# Patient Record
Sex: FEMALE | Race: WHITE | NOT HISPANIC OR LATINO | Employment: FULL TIME | URBAN - METROPOLITAN AREA
[De-identification: names, ages, dates, MRNs, and addresses within clinical notes are randomized per-mention and may not be internally consistent; named-entity substitution may affect disease eponyms.]

---

## 2023-05-08 ENCOUNTER — LAB (OUTPATIENT)
Dept: LAB | Facility: LAB | Age: 33
End: 2023-05-08
Payer: MEDICARE

## 2023-05-08 ENCOUNTER — OFFICE VISIT (OUTPATIENT)
Dept: PRIMARY CARE | Facility: CLINIC | Age: 33
End: 2023-05-08
Payer: MEDICARE

## 2023-05-08 VITALS
WEIGHT: 182 LBS | SYSTOLIC BLOOD PRESSURE: 114 MMHG | TEMPERATURE: 98.1 F | DIASTOLIC BLOOD PRESSURE: 76 MMHG | BODY MASS INDEX: 35.73 KG/M2 | HEIGHT: 60 IN | HEART RATE: 80 BPM

## 2023-05-08 DIAGNOSIS — Z13.1 DIABETES MELLITUS SCREENING: ICD-10-CM

## 2023-05-08 DIAGNOSIS — Z11.4 SCREENING FOR HIV WITHOUT PRESENCE OF RISK FACTORS: ICD-10-CM

## 2023-05-08 DIAGNOSIS — Z11.4 ENCOUNTER FOR SCREENING FOR HIV: Primary | ICD-10-CM

## 2023-05-08 DIAGNOSIS — M25.562 CHRONIC PAIN OF BOTH KNEES: ICD-10-CM

## 2023-05-08 DIAGNOSIS — R73.9 HYPERGLYCEMIA: ICD-10-CM

## 2023-05-08 DIAGNOSIS — G89.29 CHRONIC PAIN OF BOTH KNEES: ICD-10-CM

## 2023-05-08 DIAGNOSIS — Z00.00 ROUTINE GENERAL MEDICAL EXAMINATION AT HEALTH CARE FACILITY: ICD-10-CM

## 2023-05-08 DIAGNOSIS — M25.561 CHRONIC PAIN OF BOTH KNEES: ICD-10-CM

## 2023-05-08 PROBLEM — F41.1 ANXIETY, GENERALIZED: Status: ACTIVE | Noted: 2023-05-08

## 2023-05-08 PROBLEM — F84.5 ASPERGER SYNDROME (HHS-HCC): Status: ACTIVE | Noted: 2023-05-08

## 2023-05-08 PROBLEM — D64.9 ANEMIA: Status: ACTIVE | Noted: 2023-05-08

## 2023-05-08 PROBLEM — F84.9 PDD (PERVASIVE DEVELOPMENTAL DISORDER) (HHS-HCC): Status: ACTIVE | Noted: 2023-05-08

## 2023-05-08 PROCEDURE — 80053 COMPREHEN METABOLIC PANEL: CPT

## 2023-05-08 PROCEDURE — 36415 COLL VENOUS BLD VENIPUNCTURE: CPT

## 2023-05-08 PROCEDURE — 83036 HEMOGLOBIN GLYCOSYLATED A1C: CPT

## 2023-05-08 PROCEDURE — 1036F TOBACCO NON-USER: CPT | Performed by: FAMILY MEDICINE

## 2023-05-08 PROCEDURE — 87389 HIV-1 AG W/HIV-1&-2 AB AG IA: CPT

## 2023-05-08 PROCEDURE — G0439 PPPS, SUBSEQ VISIT: HCPCS | Performed by: FAMILY MEDICINE

## 2023-05-08 RX ORDER — ACETAMINOPHEN 500 MG
1000 TABLET ORAL EVERY 8 HOURS PRN
Qty: 30 TABLET | Refills: 3
Start: 2023-05-08 | End: 2023-05-18

## 2023-05-08 RX ORDER — DICLOFENAC SODIUM 10 MG/G
4 GEL TOPICAL 2 TIMES DAILY
Qty: 150 G | Refills: 2 | Status: SHIPPED | OUTPATIENT
Start: 2023-05-08 | End: 2023-05-08 | Stop reason: SDUPTHER

## 2023-05-08 RX ORDER — DICLOFENAC SODIUM 10 MG/G
4 GEL TOPICAL 2 TIMES DAILY
Qty: 150 G | Refills: 2 | Status: SHIPPED | OUTPATIENT
Start: 2023-05-08 | End: 2023-08-02

## 2023-05-08 RX ORDER — CLONAZEPAM 1 MG/1
TABLET ORAL
COMMUNITY
Start: 2010-08-03

## 2023-05-08 RX ORDER — DOCUSATE SODIUM 100 MG/1
1 CAPSULE ORAL 2 TIMES DAILY
COMMUNITY
Start: 2023-01-09

## 2023-05-08 RX ORDER — CITALOPRAM 20 MG/1
20 TABLET, FILM COATED ORAL NIGHTLY
COMMUNITY

## 2023-05-08 RX ORDER — DICLOFENAC SODIUM 10 MG/G
GEL TOPICAL 2 TIMES DAILY
COMMUNITY
Start: 2023-04-24 | End: 2023-05-08 | Stop reason: SDUPTHER

## 2023-05-08 RX ORDER — HYDROXYZINE HYDROCHLORIDE 25 MG/1
25 TABLET, FILM COATED ORAL 3 TIMES DAILY PRN
COMMUNITY
Start: 2023-04-30 | End: 2023-11-13 | Stop reason: SDUPTHER

## 2023-05-08 ASSESSMENT — ENCOUNTER SYMPTOMS
OCCASIONAL FEELINGS OF UNSTEADINESS: 1
LOSS OF SENSATION IN FEET: 0
DEPRESSION: 0

## 2023-05-08 ASSESSMENT — PATIENT HEALTH QUESTIONNAIRE - PHQ9
SUM OF ALL RESPONSES TO PHQ9 QUESTIONS 1 AND 2: 0
2. FEELING DOWN, DEPRESSED OR HOPELESS: NOT AT ALL
1. LITTLE INTEREST OR PLEASURE IN DOING THINGS: NOT AT ALL

## 2023-05-08 ASSESSMENT — PAIN SCALES - GENERAL: PAINLEVEL: 8

## 2023-05-08 ASSESSMENT — COLUMBIA-SUICIDE SEVERITY RATING SCALE - C-SSRS
2. HAVE YOU ACTUALLY HAD ANY THOUGHTS OF KILLING YOURSELF?: NO
6. HAVE YOU EVER DONE ANYTHING, STARTED TO DO ANYTHING, OR PREPARED TO DO ANYTHING TO END YOUR LIFE?: NO
1. IN THE PAST MONTH, HAVE YOU WISHED YOU WERE DEAD OR WISHED YOU COULD GO TO SLEEP AND NOT WAKE UP?: NO

## 2023-05-08 NOTE — PROGRESS NOTES
Subjective   Patient ID: Leslie Johnson is a 32 y.o. female who presents for Annual Exam and Medicare Annual Wellness Visit Subsequent.  HPI    The patient is here for her MWV.  A review of system was completed.  All systems were reviewed and were normal, except for the ones that are listed in the HPI.    Objective   Physical Exam  Constitutional:       Appearance: Normal appearance.   HENT:      Head: Normocephalic and atraumatic.      Right Ear: Tympanic membrane, ear canal and external ear normal.      Left Ear: Tympanic membrane, ear canal and external ear normal.      Nose: Nose normal.      Mouth/Throat:      Mouth: Mucous membranes are moist.      Pharynx: Oropharynx is clear.   Eyes:      Extraocular Movements: Extraocular movements intact.      Conjunctiva/sclera: Conjunctivae normal.      Pupils: Pupils are equal, round, and reactive to light.   Cardiovascular:      Rate and Rhythm: Normal rate and regular rhythm.      Pulses: Normal pulses.   Pulmonary:      Effort: Pulmonary effort is normal.      Breath sounds: Normal breath sounds.   Abdominal:      General: Abdomen is flat. Bowel sounds are normal.      Palpations: Abdomen is soft.   Musculoskeletal:         General: Normal range of motion.      Cervical back: Normal range of motion and neck supple.   Skin:     General: Skin is warm.   Neurological:      General: No focal deficit present.      Mental Status: She is alert and oriented to person, place, and time. Mental status is at baseline.   Psychiatric:         Mood and Affect: Mood normal.         Behavior: Behavior normal.         Thought Content: Thought content normal.         Judgment: Judgment normal.         Assessment/Plan   Problem List Items Addressed This Visit          Other    Encounter for screening for HIV - Primary     Other Visit Diagnoses       Hyperglycemia        Relevant Orders    Hemoglobin A1C    Diabetes mellitus screening        Relevant Orders    Hemoglobin A1C     Comprehensive Metabolic Panel    Screening for HIV without presence of risk factors        Relevant Orders    HIV-1 and HIV-2 antibodies    Routine general medical examination at health care facility        Relevant Orders    1 Year Follow Up In Primary Care - Wellness Exam    Chronic pain of both knees        Relevant Medications    diclofenac sodium (Voltaren) 1 % gel gel    acetaminophen (Tylenol Extra Strength) 500 mg tablet

## 2023-05-09 LAB
ALANINE AMINOTRANSFERASE (SGPT) (U/L) IN SER/PLAS: 11 U/L (ref 7–45)
ALBUMIN (G/DL) IN SER/PLAS: 4.6 G/DL (ref 3.4–5)
ALKALINE PHOSPHATASE (U/L) IN SER/PLAS: 57 U/L (ref 33–110)
ANION GAP IN SER/PLAS: 13 MMOL/L (ref 10–20)
ASPARTATE AMINOTRANSFERASE (SGOT) (U/L) IN SER/PLAS: 12 U/L (ref 9–39)
BILIRUBIN TOTAL (MG/DL) IN SER/PLAS: 0.8 MG/DL (ref 0–1.2)
CALCIUM (MG/DL) IN SER/PLAS: 10.3 MG/DL (ref 8.6–10.6)
CARBON DIOXIDE, TOTAL (MMOL/L) IN SER/PLAS: 29 MMOL/L (ref 21–32)
CHLORIDE (MMOL/L) IN SER/PLAS: 103 MMOL/L (ref 98–107)
CREATININE (MG/DL) IN SER/PLAS: 0.86 MG/DL (ref 0.5–1.05)
ESTIMATED AVERAGE GLUCOSE FOR HBA1C: 103 MG/DL
GFR FEMALE: 90 ML/MIN/1.73M2
GLUCOSE (MG/DL) IN SER/PLAS: 82 MG/DL (ref 74–99)
HEMOGLOBIN A1C/HEMOGLOBIN TOTAL IN BLOOD: 5.2 %
HIV 1/ 2 AG/AB SCREEN: NONREACTIVE
POTASSIUM (MMOL/L) IN SER/PLAS: 4.3 MMOL/L (ref 3.5–5.3)
PROTEIN TOTAL: 7.2 G/DL (ref 6.4–8.2)
SODIUM (MMOL/L) IN SER/PLAS: 141 MMOL/L (ref 136–145)
UREA NITROGEN (MG/DL) IN SER/PLAS: 11 MG/DL (ref 6–23)

## 2023-06-12 LAB
COBALAMIN (VITAMIN B12) (PG/ML) IN SER/PLAS: 252 PG/ML (ref 211–911)
CREATINE KINASE (U/L) IN SER/PLAS: 78 U/L (ref 0–215)

## 2023-08-30 LAB
CHLAMYDIA TRACH., AMPLIFIED: NEGATIVE
N. GONORRHEA, AMPLIFIED: NEGATIVE
TRICHOMONAS VAGINALIS: NEGATIVE

## 2023-10-22 PROBLEM — M62.838 MUSCLE SPASM: Status: ACTIVE | Noted: 2023-10-22

## 2023-10-22 PROBLEM — R20.1: Status: ACTIVE | Noted: 2023-10-22

## 2023-10-22 PROBLEM — R41.83 BORDERLINE INTELLECTUAL DISABILITY: Status: ACTIVE | Noted: 2023-10-22

## 2023-10-22 PROBLEM — G24.9 DYSTONIA: Status: ACTIVE | Noted: 2023-10-22

## 2023-10-22 PROBLEM — R74.8 ELEVATED LIVER ENZYMES: Status: ACTIVE | Noted: 2023-10-22

## 2023-10-22 PROBLEM — R20.2 PARESTHESIA OF RIGHT FOOT: Status: ACTIVE | Noted: 2023-10-22

## 2023-10-22 PROBLEM — D31.31 CHOROIDAL NEVUS OF RIGHT EYE: Status: ACTIVE | Noted: 2023-10-22

## 2023-10-22 PROBLEM — T43.595A: Status: ACTIVE | Noted: 2023-10-22

## 2023-10-22 PROBLEM — R29.2 HYPERREFLEXIA: Status: ACTIVE | Noted: 2023-10-22

## 2023-10-22 PROBLEM — E66.9 OBESITY: Status: ACTIVE | Noted: 2023-10-22

## 2023-10-22 PROBLEM — R29.6 FREQUENT FALLS: Status: ACTIVE | Noted: 2023-10-22

## 2023-10-22 PROBLEM — F32.A DEPRESSION: Status: ACTIVE | Noted: 2023-10-22

## 2023-10-22 PROBLEM — R87.612 LGSIL ON PAP SMEAR OF CERVIX: Status: ACTIVE | Noted: 2023-10-22

## 2023-10-22 PROBLEM — R29.898 BILATERAL LEG WEAKNESS: Status: ACTIVE | Noted: 2023-10-22

## 2023-10-22 PROBLEM — G89.29 CHRONIC PAIN OF BOTH KNEES: Status: ACTIVE | Noted: 2023-10-22

## 2023-10-22 PROBLEM — H52.13 BILATERAL MYOPIA: Status: ACTIVE | Noted: 2023-10-22

## 2023-10-22 PROBLEM — F63.3 TRICHOTILLOMANIA: Status: ACTIVE | Noted: 2023-02-23

## 2023-10-22 PROBLEM — G21.19 DRUG-INDUCED PARKINSONISM (MULTI): Status: ACTIVE | Noted: 2023-02-23

## 2023-10-22 PROBLEM — M25.562 CHRONIC PAIN OF BOTH KNEES: Status: ACTIVE | Noted: 2023-10-22

## 2023-10-22 PROBLEM — N91.2 AMENORRHEA: Status: ACTIVE | Noted: 2023-10-22

## 2023-10-22 PROBLEM — K21.9 GERD (GASTROESOPHAGEAL REFLUX DISEASE): Status: ACTIVE | Noted: 2023-10-22

## 2023-10-22 PROBLEM — R79.89 LOW VITAMIN B12 LEVEL: Status: ACTIVE | Noted: 2023-10-22

## 2023-10-22 PROBLEM — R26.89 IMBALANCE: Status: ACTIVE | Noted: 2023-10-22

## 2023-10-22 PROBLEM — F41.9 ANXIETY: Status: ACTIVE | Noted: 2023-10-22

## 2023-10-22 PROBLEM — K64.9 HEMORRHOIDS: Status: ACTIVE | Noted: 2023-10-22

## 2023-10-22 PROBLEM — N92.6 ABNORMAL MENSTRUAL PERIODS: Status: ACTIVE | Noted: 2023-10-22

## 2023-10-22 PROBLEM — M25.561 CHRONIC PAIN OF BOTH KNEES: Status: ACTIVE | Noted: 2023-10-22

## 2023-10-22 PROBLEM — K62.89 PROCTALGIA: Status: ACTIVE | Noted: 2023-10-22

## 2023-10-22 RX ORDER — POLYETHYLENE GLYCOL 3350 17 G/17G
POWDER, FOR SOLUTION ORAL
COMMUNITY
End: 2023-11-13 | Stop reason: SDUPTHER

## 2023-10-22 RX ORDER — BACLOFEN 5 MG/1
3 TABLET ORAL 3 TIMES DAILY
COMMUNITY
Start: 2023-06-09 | End: 2023-11-13 | Stop reason: SDUPTHER

## 2023-10-22 RX ORDER — MICONAZOLE NITRATE 2 %
2 CREAM (GRAM) TOPICAL EVERY 2 HOUR PRN
COMMUNITY
Start: 2020-01-09 | End: 2023-11-13 | Stop reason: WASHOUT

## 2023-10-22 RX ORDER — CLONAZEPAM 0.5 MG/1
0.5 TABLET ORAL 2 TIMES DAILY
COMMUNITY
Start: 2020-01-09 | End: 2023-11-13 | Stop reason: WASHOUT

## 2023-10-22 RX ORDER — OXCARBAZEPINE 300 MG/1
300 TABLET, FILM COATED ORAL 2 TIMES DAILY
COMMUNITY
Start: 2023-07-19

## 2023-10-22 RX ORDER — DIVALPROEX SODIUM 250 MG/1
TABLET, FILM COATED, EXTENDED RELEASE ORAL
COMMUNITY
Start: 2010-08-10

## 2023-10-22 RX ORDER — LAMOTRIGINE 25 MG/1
TABLET ORAL
COMMUNITY
Start: 2022-10-19

## 2023-10-22 RX ORDER — DIVALPROEX SODIUM 500 MG/1
2000 TABLET, FILM COATED, EXTENDED RELEASE ORAL NIGHTLY
COMMUNITY
Start: 2011-03-02

## 2023-10-22 RX ORDER — ZINC GLUCONATE 50 MG
2000 TABLET ORAL DAILY
COMMUNITY
Start: 2023-06-15

## 2023-10-22 RX ORDER — PANTOPRAZOLE SODIUM 20 MG/1
TABLET, DELAYED RELEASE ORAL
COMMUNITY
Start: 2020-01-09

## 2023-10-22 RX ORDER — TOPIRAMATE 50 MG/1
50 TABLET, FILM COATED ORAL 2 TIMES DAILY
COMMUNITY
Start: 2011-03-02 | End: 2023-11-13 | Stop reason: WASHOUT

## 2023-10-22 RX ORDER — SERTRALINE HYDROCHLORIDE 100 MG/1
100 TABLET, FILM COATED ORAL
COMMUNITY
Start: 2013-11-19

## 2023-10-22 RX ORDER — MEDROXYPROGESTERONE ACETATE 150 MG/ML
1 INJECTION, SUSPENSION INTRAMUSCULAR
COMMUNITY
Start: 2019-09-26

## 2023-10-22 RX ORDER — BENZTROPINE MESYLATE 2 MG/1
TABLET ORAL
COMMUNITY
Start: 2020-08-28

## 2023-10-22 RX ORDER — LITHIUM CARBONATE 450 MG/1
1 TABLET ORAL 2 TIMES DAILY
COMMUNITY
Start: 2022-12-16 | End: 2023-11-13 | Stop reason: WASHOUT

## 2023-10-22 RX ORDER — ACETAMINOPHEN 500 MG
1 TABLET ORAL EVERY 6 HOURS PRN
COMMUNITY
Start: 2023-02-17 | End: 2023-11-13 | Stop reason: SDUPTHER

## 2023-10-22 RX ORDER — BENZTROPINE MESYLATE 1 MG/1
TABLET ORAL
COMMUNITY
Start: 2020-08-28

## 2023-10-22 RX ORDER — LISDEXAMFETAMINE DIMESYLATE 30 MG/1
30 CAPSULE ORAL EVERY MORNING
COMMUNITY
Start: 2023-03-08

## 2023-10-22 RX ORDER — OXCARBAZEPINE 150 MG/1
TABLET, FILM COATED ORAL
COMMUNITY
Start: 2023-04-18

## 2023-10-22 RX ORDER — ASPIRIN 325 MG
1 TABLET, DELAYED RELEASE (ENTERIC COATED) ORAL NIGHTLY
COMMUNITY
Start: 2014-04-10

## 2023-10-22 RX ORDER — DEXTROAMPHETAMINE SULFATE, DEXTROAMPHETAMINE SACCHARATE, AMPHETAMINE ASPARTATE MONOHYDRATE, AND AMPHETAMINE SULFATE 6.25; 6.25; 6.25; 6.25 MG/1; MG/1; MG/1; MG/1
25 CAPSULE, EXTENDED RELEASE ORAL EVERY MORNING
COMMUNITY
Start: 2023-07-19

## 2023-10-22 RX ORDER — LITHIUM CARBONATE 300 MG/1
1 CAPSULE ORAL 2 TIMES DAILY
COMMUNITY
Start: 2023-01-04

## 2023-10-22 RX ORDER — DEXTROAMPHETAMINE SACCHARATE, AMPHETAMINE ASPARTATE MONOHYDRATE, DEXTROAMPHETAMINE SULFATE AND AMPHETAMINE SULFATE 5; 5; 5; 5 MG/1; MG/1; MG/1; MG/1
CAPSULE, EXTENDED RELEASE ORAL
COMMUNITY
Start: 2023-02-06

## 2023-10-22 RX ORDER — DEXTROAMPHETAMINE SACCHARATE, AMPHETAMINE ASPARTATE, DEXTROAMPHETAMINE SULFATE AND AMPHETAMINE SULFATE 2.5; 2.5; 2.5; 2.5 MG/1; MG/1; MG/1; MG/1
TABLET ORAL
COMMUNITY
Start: 2023-01-04

## 2023-10-22 RX ORDER — DEXTROAMPHETAMINE SULFATE, DEXTROAMPHETAMINE SACCHARATE, AMPHETAMINE ASPARTATE MONOHYDRATE, AND AMPHETAMINE SULFATE 9.375; 9.375; 9.375; 9.375 MG/1; MG/1; MG/1; MG/1
37.5 CAPSULE, EXTENDED RELEASE ORAL EVERY MORNING
COMMUNITY
Start: 2023-06-28

## 2023-10-22 RX ORDER — NORGESTIMATE AND ETHINYL ESTRADIOL 0.25-0.035
1 KIT ORAL
COMMUNITY
Start: 2014-10-09

## 2023-10-22 RX ORDER — FLUCONAZOLE 150 MG/1
150 TABLET ORAL
COMMUNITY
Start: 2014-04-10

## 2023-10-22 RX ORDER — DICLOFENAC SODIUM 30 MG/G
GEL TOPICAL 2 TIMES DAILY
COMMUNITY
Start: 2023-01-27 | End: 2023-11-13 | Stop reason: SDUPTHER

## 2023-10-22 RX ORDER — LURASIDONE HYDROCHLORIDE 40 MG/1
40 TABLET, FILM COATED ORAL
COMMUNITY
Start: 2014-02-11

## 2023-10-22 RX ORDER — QUETIAPINE 400 MG/1
800 TABLET, FILM COATED, EXTENDED RELEASE ORAL EVERY EVENING
COMMUNITY
Start: 2011-03-02

## 2023-10-24 ENCOUNTER — TREATMENT (OUTPATIENT)
Dept: PHYSICAL THERAPY | Facility: CLINIC | Age: 33
End: 2023-10-24
Payer: MEDICARE

## 2023-10-24 DIAGNOSIS — R26.9 GAIT ABNORMALITY: Primary | ICD-10-CM

## 2023-10-24 PROCEDURE — 97110 THERAPEUTIC EXERCISES: CPT | Mod: GP | Performed by: PHYSICAL THERAPIST

## 2023-10-24 PROCEDURE — 97112 NEUROMUSCULAR REEDUCATION: CPT | Mod: GP | Performed by: PHYSICAL THERAPIST

## 2023-10-24 NOTE — PROGRESS NOTES
"Physical Therapy    Physical Therapy Treatment    Patient Name: Leslie Johnson  MRN: [id number]  Today's Date: 10/26/2023  Time Calculation  Start Time: 1800  Stop Time: 1830  Time Calculation (min): 30 min      Assessment:    Attempted to lower patient cane during session, however pt. Decline reporting that she likes her cane at a higher height.  Pt. With intermittent antalgic gait intermittently kicking her lower extremities.  Immediately following mat table exercises, pt. With normal gait pattern. However, on the way out the patient returned to abnormal gait pattern.      Plan: functional mobility with weighted vest , review HEP       Current Problem  1. Gait abnormality            Subjective   General:  Pt. Might be moving in mid November.  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  She has a team meeting on 10/27/24, it is a zoom meeting, county board her mom and behavioral health specialist and [employer].  Pt. Was brought to session by \"Lamar\" from [employer]. Pt. Reports that her mom told her to \"kick\" her legs when she is walking.     *Pt. Arrived 15 minutes late to today's visit.      Pain: numbness/tingling in Left lower leg, pt. Denies pain  Precautions: bipolar I disorder, trichotillomania, PDD, borderline intellecutal disability     Objective        Pt. Arrived to session with SPC, abnormal gait pattern, antalgic, inconsistent gait pattern, intermittently kicking lower extremities          Treatments:  There Ex:  Recumbent bike BLE's only at L 2.0mph for 10 minutes  Attempted to lower pt. SPC down to appropriate height however pt. Declined this recommendation.  Suggested trekking pole versus SPC however pt. Declined this recommendation    Neuro Re-Ed:  Parasympathetic nervous system activation with deep breathing while sitting on stability ball, exhale bouncing pink ball x4 sec. and in hale holding ball 4 sec. X10  Core strengthening, proprioceptive challenge " sitting on stability ball with pink ball toss/catch  Mat table exercises for improving proprioceptive activation with PT demo throughout for proper technique :   Quadruped to tall kneeling x10   Quadruped twist x10 to each side   Quadruped WS x5      OP EDUCATION: educated pt. On benefits of deep breathing, yoga for stress management and promoting relaxation     Goals:  Active       PT Problem       PT Goal 1       Start:  10/26/23       Plan of care is continued from [facility] legacy documentation system.  In order to view goals for this plan of care please refer to legacy documentation system.

## 2023-10-26 PROBLEM — R26.9 GAIT ABNORMALITY: Status: ACTIVE | Noted: 2023-10-26

## 2023-10-31 ENCOUNTER — TREATMENT (OUTPATIENT)
Dept: PHYSICAL THERAPY | Facility: CLINIC | Age: 33
End: 2023-10-31
Payer: MEDICARE

## 2023-10-31 DIAGNOSIS — R26.9 GAIT ABNORMALITY: Primary | ICD-10-CM

## 2023-10-31 PROCEDURE — 97110 THERAPEUTIC EXERCISES: CPT | Mod: GP | Performed by: PHYSICAL THERAPIST

## 2023-10-31 PROCEDURE — 97112 NEUROMUSCULAR REEDUCATION: CPT | Mod: GP | Performed by: PHYSICAL THERAPIST

## 2023-10-31 NOTE — PROGRESS NOTES
"Physical Therapy    Physical Therapy Treatment    Patient Name: Leslie Johnson  MRN: [id number]  Today's Date: 11/1/2023  Treatment Number: 3  Time Calculation  Start Time: 1546  Stop Time: 1629  Time Calculation (min): 43 min      Assessment:    Pt. Responded well to promotion of normalized gait pattern with dual motor tasks such as ball toss/catch. Pt. With improved functional mobility fluidity and quality directly after session until she got to the parking lot, where at that point the patient demonstrated decreased left knee flexion and limping gait.  Time was spent reviewing pt. HEP exercises for improving core strength, pt. Benefitted from PT demo throughout with pt. Preferred music.  Pt. Did not respond to weighted vest to improve proprioception therefore weighted vest was removed.     Plan: functional mobility with weighted vest , review HEP       Current Problem  1. Gait abnormality              Subjective   Pt. Reports that she is moving on 11/27/23.  She has been using her cane more often.  She brings her walker to higher heights.      Pain: numbness/tingling in Left lower leg, pt. Denies pain  Precautions: bipolar I disorder, trichotillomania, PDD, borderline intellecutal disability     Objective        Pt. Arrived to session with SPC, abnormal gait pattern, antalgic, inconsistent gait pattern, intermittently kicking lower extremities        Treatments:  There Ex:  Recumbent bike BLE's only at L 3.0mph for 10 minutes for promoting neuroplasticity  Provided pt. And [employer] provider \"pantera\" with printed handout of HEP    Neuro Re-Ed:  [place]limpo SB bounce and catch with functional mobility to unlock more natural gait pattern 80'x1  Backwards pink SB ball toss/ catch for balance and promotion of increasing fluidity of gait, promoting L knee flexion  Mat table exercises for improving proprioceptive activation with PT demo throughout for proper technique with pt. Preferred music :   Quadruped to tall kneeling " x10   Quadruped twist x10 to each side   Quadruped WS x10   Quadruped to half kneeling x5 each side  Attempted soccer ball kick with weighted vest however pt. Requesting to have vest removed.  Soccer ball stop/kick x10 with each LE to promote improved knee flexion bilaterally.        OP EDUCATION:      Goals:  Active       PT Problem       PT Goal 1       Start:  10/26/23       Plan of care is continued from [facility] legacy documentation system.  In order to view goals for this plan of care please refer to legacy documentation system.

## 2023-11-07 ENCOUNTER — TREATMENT (OUTPATIENT)
Dept: PHYSICAL THERAPY | Facility: CLINIC | Age: 33
End: 2023-11-07
Payer: MEDICARE

## 2023-11-07 DIAGNOSIS — R26.9 GAIT ABNORMALITY: ICD-10-CM

## 2023-11-07 DIAGNOSIS — R26.89 IMBALANCE: Primary | ICD-10-CM

## 2023-11-07 PROCEDURE — 97110 THERAPEUTIC EXERCISES: CPT | Mod: GP | Performed by: PHYSICAL THERAPIST

## 2023-11-07 PROCEDURE — 97112 NEUROMUSCULAR REEDUCATION: CPT | Mod: GP | Performed by: PHYSICAL THERAPIST

## 2023-11-07 NOTE — PROGRESS NOTES
"Physical Therapy    Physical Therapy Treatment    Patient Name: Leslie Johnson  MRN: [id number]  Today's Date: 11/7/2023  Treatment Number: 3  Time Calculation  Start Time: 1645  Stop Time: 1730  Time Calculation (min): 45 min      Assessment:    Pt. Demonstrated improved gait pattern with obstacle negotiation in forward and lateral directions. Overall pt. Is demonstrating increased fluidity of gait and is demonstrating improved left sided knee flexion during swing phase versus tendency to keep left knee extended in swing phase.  Pt. Responded well to having therapist mirror each exercise for demonstration in order to increase accuracy of technique.      Plan: lunging, step-ups laterally, stepping over objects, review HEP       Current Problem  1. Imbalance        2. Gait abnormality                Subjective   Pt. Reports that she is moving on 11/27/23.  She has been using her cane more often.  She brings her walker to higher heights.      Pain: numbness/tingling in Left lower leg, pt. Denies pain  Precautions: bipolar I disorder, trichotillomania, PDD, borderline intellecutal disability     Objective        Pt. Arrived to session with SPC, abnormal gait pattern, antalgic, inconsistent gait pattern, intermittently kicking lower extremities        Treatments:  There Ex:  Recumbent bike BLE's only at L 3.0mph for 10 minutes for promoting neuroplasticity  Provided pt. And [employer] provider \"pantera\" with printed handout of HEP    Step up/down laterally x10 to each side with occasional unilateral UE support     Neuro Re-Ed:  Mat table exercises for improving proprioceptive activation with PT demo throughout for proper technique with pt. Preferred music :   Quadruped to tall kneeling x10   Quadruped twist x10 to each side   Quadruped WS x10   Quadruped to half kneeling x5 each side  Soccer ball stop/kick x10 with each LE to promote improved knee flexion bilaterally.      Obstacle course F with hurdles, SPC, SBQC, LBQC " spaced out 2-3 ft. X2 laps  Obstacle course lateral with hurdles, SPC, SBQC, LBQC spaced out 2-3 ft. X1 laps each side  Forward lunges x10 each side lunging to blue tile   Forward lunges x10 alternating side to blue tile      Side lunges x10 with 180' turns between with PT demo, lunging to blue tile    OP EDUCATION:      Goals:  Active       PT Problem       PT Goal 1       Start:  10/26/23       Plan of care is continued from [facility] legacy documentation system.  In order to view goals for this plan of care please refer to legacy documentation system.

## 2023-11-08 ENCOUNTER — APPOINTMENT (OUTPATIENT)
Dept: PRIMARY CARE | Facility: CLINIC | Age: 33
End: 2023-11-08
Payer: MEDICARE

## 2023-11-10 ENCOUNTER — TREATMENT (OUTPATIENT)
Dept: PHYSICAL THERAPY | Facility: CLINIC | Age: 33
End: 2023-11-10
Payer: MEDICARE

## 2023-11-10 DIAGNOSIS — R26.89 IMBALANCE: ICD-10-CM

## 2023-11-10 DIAGNOSIS — R26.9 GAIT ABNORMALITY: Primary | ICD-10-CM

## 2023-11-10 PROCEDURE — 97530 THERAPEUTIC ACTIVITIES: CPT | Mod: GP | Performed by: PHYSICAL THERAPIST

## 2023-11-10 PROCEDURE — 97110 THERAPEUTIC EXERCISES: CPT | Mod: GP | Performed by: PHYSICAL THERAPIST

## 2023-11-10 NOTE — PROGRESS NOTES
"Physical Therapy    Physical Therapy Treatment    Patient Name: Leslie Johnson  MRN: [id number]  Today's Date: 11/10/2023  Treatment Number: 4  Time Calculation  Start Time: 1450  Stop Time: 1535  Time Calculation (min): 45 min    Assessment:    Pt. Requires maximal VC for attention to task due to pt. With tendency to focus on situations that are occurring externally.  Pt. With increased abberent left leg movement today compared to prior visit. Pt. With increased accuracy during mat table exercises, with good recall of technique. Pt. Benefitted from counting during exercises to allow for improved focus on each exercise.      Plan: lunging, step-ups laterally, stepping over objects, review HEP       Current Problem  1. Gait abnormality        2. Imbalance                  Subjective   Pt. Reports that she is triggered because of a situation that happened with \"Racheal\" and pt. Reports \" I felt like I was on house arrest in my own home\"      Pain: numbness/tingling in Left lower leg, pt. Denies pain  Precautions: bipolar I disorder, trichotillomania, PDD, borderline intellecutal disability     Objective        Pt. Arrived to session with SPC, abnormal gait pattern, antalgic, inconsistent gait pattern, intermittently kicking lower extremities        Treatments:  There Ex:  Recumbent bike BLE's only at L 3.0mph for 10 minutes for promoting neuroplasticity    Provided pt. And [employer] provider \"pantera\" with printed handout of HEP    Step up/down laterally x15 to each side with occasional unilateral UE support     Neuro Re-Ed:  Mat table exercises for improving proprioceptive activation with PT demo throughout for proper technique with pt. Preferred music :   Quadruped to tall kneeling x10   Quadruped twist x10 to each side   Quadruped to half kneeling x10 each side    Soccer ball kicks to \"goal\" x10 on each side     Obstacle course F with hurdles x5 hurdles x6 laps (x3 laps leading with RLE and x3 laps with LLE)    Forward " lunges x10 each side lunging to blue tile   Forward lunges x10 alternating side to blue tile    Lateral lunges on blue tile x10 with turn in between     Side lunges x10 with 180' turns between with PT demo, lunging to blue tile    OP EDUCATION:      Goals:  Active       PT Problem       PT Goal 1       Start:  10/26/23       Plan of care is continued from [facility] legacy documentation system.  In order to view goals for this plan of care please refer to legacy documentation system.

## 2023-11-13 ENCOUNTER — OFFICE VISIT (OUTPATIENT)
Dept: PRIMARY CARE | Facility: CLINIC | Age: 33
End: 2023-11-13
Payer: MEDICARE

## 2023-11-13 VITALS
SYSTOLIC BLOOD PRESSURE: 100 MMHG | BODY MASS INDEX: 29.67 KG/M2 | HEART RATE: 76 BPM | WEIGHT: 162.2 LBS | DIASTOLIC BLOOD PRESSURE: 70 MMHG | TEMPERATURE: 98 F

## 2023-11-13 DIAGNOSIS — K59.00 CONSTIPATION, UNSPECIFIED CONSTIPATION TYPE: ICD-10-CM

## 2023-11-13 DIAGNOSIS — R29.898 BILATERAL LEG WEAKNESS: ICD-10-CM

## 2023-11-13 DIAGNOSIS — Z23 IMMUNIZATION DUE: Primary | ICD-10-CM

## 2023-11-13 DIAGNOSIS — F41.1 ANXIETY, GENERALIZED: ICD-10-CM

## 2023-11-13 PROCEDURE — G0008 ADMIN INFLUENZA VIRUS VAC: HCPCS | Performed by: FAMILY MEDICINE

## 2023-11-13 PROCEDURE — 99214 OFFICE O/P EST MOD 30 MIN: CPT | Performed by: FAMILY MEDICINE

## 2023-11-13 PROCEDURE — 1036F TOBACCO NON-USER: CPT | Performed by: FAMILY MEDICINE

## 2023-11-13 PROCEDURE — 90686 IIV4 VACC NO PRSV 0.5 ML IM: CPT | Performed by: FAMILY MEDICINE

## 2023-11-13 RX ORDER — POLYETHYLENE GLYCOL 3350 17 G/17G
POWDER, FOR SOLUTION ORAL
Qty: 510 G | Refills: 3 | Status: SHIPPED | OUTPATIENT
Start: 2023-11-13 | End: 2023-11-13 | Stop reason: SDUPTHER

## 2023-11-13 RX ORDER — ACETAMINOPHEN 500 MG
500 TABLET ORAL EVERY 6 HOURS PRN
Qty: 60 TABLET | Refills: 3 | Status: SHIPPED | OUTPATIENT
Start: 2023-11-13 | End: 2023-11-13 | Stop reason: SDUPTHER

## 2023-11-13 RX ORDER — HYDROXYZINE HYDROCHLORIDE 25 MG/1
25 TABLET, FILM COATED ORAL 3 TIMES DAILY PRN
Qty: 90 TABLET | Refills: 1 | Status: SHIPPED | OUTPATIENT
Start: 2023-11-13 | End: 2023-11-13 | Stop reason: SDUPTHER

## 2023-11-13 RX ORDER — POLYETHYLENE GLYCOL 3350 17 G/17G
POWDER, FOR SOLUTION ORAL
Qty: 510 G | Refills: 3 | Status: SHIPPED | OUTPATIENT
Start: 2023-11-13 | End: 2024-03-06 | Stop reason: SDUPTHER

## 2023-11-13 RX ORDER — HYDROXYZINE HYDROCHLORIDE 25 MG/1
25 TABLET, FILM COATED ORAL 3 TIMES DAILY PRN
Qty: 90 TABLET | Refills: 1 | Status: SHIPPED | OUTPATIENT
Start: 2023-11-13 | End: 2023-11-15 | Stop reason: SDUPTHER

## 2023-11-13 RX ORDER — BACLOFEN 5 MG/1
15 TABLET ORAL 3 TIMES DAILY
Qty: 90 TABLET | Refills: 5 | Status: SHIPPED | OUTPATIENT
Start: 2023-11-13 | End: 2023-11-13 | Stop reason: SDUPTHER

## 2023-11-13 RX ORDER — ACETAMINOPHEN 500 MG
500 TABLET ORAL EVERY 6 HOURS PRN
Qty: 60 TABLET | Refills: 3 | Status: SHIPPED | OUTPATIENT
Start: 2023-11-13

## 2023-11-13 RX ORDER — DICLOFENAC SODIUM 10 MG/G
4 GEL TOPICAL 4 TIMES DAILY
Qty: 150 G | Refills: 3 | Status: SHIPPED | OUTPATIENT
Start: 2023-11-13

## 2023-11-13 RX ORDER — DICLOFENAC SODIUM 30 MG/G
GEL TOPICAL 2 TIMES DAILY
Qty: 100 G | Refills: 5 | Status: SHIPPED | OUTPATIENT
Start: 2023-11-13 | End: 2023-11-13 | Stop reason: WASHOUT

## 2023-11-13 RX ORDER — DICLOFENAC SODIUM 10 MG/G
4 GEL TOPICAL 4 TIMES DAILY
Qty: 150 G | Refills: 3 | Status: SHIPPED | OUTPATIENT
Start: 2023-11-13 | End: 2023-11-13 | Stop reason: SDUPTHER

## 2023-11-13 RX ORDER — BACLOFEN 5 MG/1
15 TABLET ORAL 3 TIMES DAILY
Qty: 90 TABLET | Refills: 5 | Status: SHIPPED | OUTPATIENT
Start: 2023-11-13 | End: 2023-12-01 | Stop reason: SDUPTHER

## 2023-11-13 NOTE — PROGRESS NOTES
Subjective   Patient ID: Leslie Johnson is a 33 y.o. female who presents for Follow-up.  HPI    The patient is here for a routine follow-up visit.  She started a new job and is required to have:  - influenza vaccine.  -A review of system was completed.      All systems were reviewed and were normal, except for the ones that are listed in the HPI.    Objective   Physical Exam  Constitutional:       Appearance: Normal appearance.   HENT:      Head: Normocephalic and atraumatic.      Right Ear: Tympanic membrane, ear canal and external ear normal.      Left Ear: Tympanic membrane, ear canal and external ear normal.      Nose: Nose normal.      Mouth/Throat:      Mouth: Mucous membranes are moist.      Pharynx: Oropharynx is clear.   Eyes:      Extraocular Movements: Extraocular movements intact.      Conjunctiva/sclera: Conjunctivae normal.      Pupils: Pupils are equal, round, and reactive to light.   Cardiovascular:      Rate and Rhythm: Normal rate and regular rhythm.      Pulses: Normal pulses.   Pulmonary:      Effort: Pulmonary effort is normal.      Breath sounds: Normal breath sounds.   Abdominal:      General: Abdomen is flat. Bowel sounds are normal.      Palpations: Abdomen is soft.   Musculoskeletal:         General: Normal range of motion.      Cervical back: Normal range of motion and neck supple.   Skin:     General: Skin is warm.   Neurological:      General: No focal deficit present.      Mental Status: She is alert and oriented to person, place, and time. Mental status is at baseline.   Psychiatric:         Mood and Affect: Mood normal.         Behavior: Behavior normal.         Thought Content: Thought content normal.         Judgment: Judgment normal.     Assessment/Plan   Problem List Items Addressed This Visit       Anxiety, generalized    Relevant Medications    hydrOXYzine HCL (Atarax) 25 mg tablet    Bilateral leg weakness    Relevant Medications    baclofen (Lioresal) 5 mg tablet     diclofenac sodium 3 % gel    acetaminophen (Tylenol) 500 mg tablet    Immunization due - Primary     Other Visit Diagnoses       Constipation, unspecified constipation type        Relevant Medications    polyethylene glycol (Glycolax, Miralax) 17 gram/dose powder           Patient to return to office in 5/2024 for a CE.

## 2023-11-14 ENCOUNTER — TELEPHONE (OUTPATIENT)
Dept: PRIMARY CARE | Facility: CLINIC | Age: 33
End: 2023-11-14

## 2023-11-14 ENCOUNTER — TREATMENT (OUTPATIENT)
Dept: PHYSICAL THERAPY | Facility: CLINIC | Age: 33
End: 2023-11-14
Payer: MEDICARE

## 2023-11-14 DIAGNOSIS — R26.89 IMBALANCE: Primary | ICD-10-CM

## 2023-11-14 DIAGNOSIS — L85.3 DRY SKIN: Primary | ICD-10-CM

## 2023-11-14 DIAGNOSIS — F41.1 ANXIETY, GENERALIZED: ICD-10-CM

## 2023-11-14 DIAGNOSIS — R26.9 FUNCTIONAL GAIT DISORDER: ICD-10-CM

## 2023-11-14 PROCEDURE — 97112 NEUROMUSCULAR REEDUCATION: CPT | Mod: GP | Performed by: PHYSICAL THERAPIST

## 2023-11-14 PROCEDURE — 97110 THERAPEUTIC EXERCISES: CPT | Mod: GP | Performed by: PHYSICAL THERAPIST

## 2023-11-14 NOTE — TELEPHONE ENCOUNTER
Pt called stating that hydroxyzine 25mg is only 1 before bed every day not 3 times a day. Please fix the prescription and patient wants it printed. Pt also wants to know if you could prescribe a lotion for her sensitive skin that is covered by her insurance please advise.

## 2023-11-14 NOTE — PROGRESS NOTES
"Physical Therapy    Physical Therapy Treatment    Patient Name: Leslie Johnson  MRN: [id number]  Today's Date: 11/14/2023  Treatment Number: 6  Time Calculation  Start Time: 1400  Stop Time: 1445  Time Calculation (min): 45 min    Assessment:    Pt. Is utilizing her cane and walker less frequently which is a good sign for improvement. Pt. Has intermittent L knee hyperextension with extension of LLE during swing phase.  The patient corrected this pattern with practice during visit. Pt. Also with normal gait noted during backwards walking with no difficulty with isolating hip and knee flexion of the left lower extremity.  Pt. Demonstrated improved attention to task during today's visit.  Pt. Reported that she prefers to focus on walking only and declined working on CBT strategies or art work as a form of promoting parasympathetic nervous system activity.      Plan: lunging, step-ups laterally, stepping over objects, review HEP       Current Problem  1. Imbalance        2. Functional gait disorder              Subjective   Pt. Reports that she \"doesn't want to work on art or strategies at all and that is part of mental health counseling\"  Pt. Reports that she hasn't been using her cane or walker as much.  She is worried because once she moves she isnt sure how she is going to get to her appointments.  She prefers not to listen to music today.    Pain: numbness/tingling in Left lower leg, pt. Denies pain  Precautions: bipolar I disorder, trichotillomania, PDD, borderline intellecutal disability     Objective        Pt. Arrived to session with SPC, abnormal gait pattern, antalgic, inconsistent gait pattern, intermittently kicking lower extremities        Treatments:  There Ex:  Recumbent bike BLE's only at L 3.0mph for 10 minutes for promoting neuroplasticity    Step up/down laterally x15 to each side with occasional unilateral UE support x1     Neuro Re-Ed:    Obstacle course F with hurdles x5 hurdles x6 laps (x3 laps " "leading with RLE and x3 laps with LLE)    Obstacle course lateral with hurdles x5, xSPC and SBQC x2 reps each side    Forward lunges x10 each side lunging to blue tile placed on foam  Forward lunges x10 alternating side to blue tile placed on foam    Side lunges x10 with 180' turns between with PT demo, lunging to blue tile    Functional mobility with heel to toe and VC to \"kick ball\" if pt. Knee remained straightened 30'x4 reps    Backwards walking 30\"x2    OP EDUCATION:      Goals:  Active       PT Problem       PT Goal 1       Start:  10/26/23       Plan of care is continued from [facility] legacy documentation system.  In order to view goals for this plan of care please refer to legacy documentation system.                     "

## 2023-11-15 RX ORDER — DIMETHICONE/COLLOIDAL OATMEAL 1.25 %
1 LOTION (ML) TOPICAL
Qty: 227 ML | Refills: 5 | Status: SHIPPED | OUTPATIENT
Start: 2023-11-15 | End: 2024-03-06 | Stop reason: SDUPTHER

## 2023-11-15 RX ORDER — HYDROXYZINE HYDROCHLORIDE 25 MG/1
25 TABLET, FILM COATED ORAL DAILY
Qty: 90 TABLET | Refills: 1 | Status: SHIPPED | OUTPATIENT
Start: 2023-11-15 | End: 2024-03-06 | Stop reason: SDUPTHER

## 2023-11-15 NOTE — TELEPHONE ENCOUNTER
Both prescriptions printed.  Please notify the patient and see if she is willing to pick them up or to have it mailed to her.

## 2023-11-17 ENCOUNTER — TREATMENT (OUTPATIENT)
Dept: PHYSICAL THERAPY | Facility: CLINIC | Age: 33
End: 2023-11-17
Payer: MEDICARE

## 2023-11-17 DIAGNOSIS — R26.9 GAIT ABNORMALITY: ICD-10-CM

## 2023-11-17 DIAGNOSIS — R26.9 FUNCTIONAL GAIT DISORDER: Primary | ICD-10-CM

## 2023-11-17 PROCEDURE — 97110 THERAPEUTIC EXERCISES: CPT | Mod: GP | Performed by: PHYSICAL THERAPIST

## 2023-11-17 PROCEDURE — 97112 NEUROMUSCULAR REEDUCATION: CPT | Mod: GP | Performed by: PHYSICAL THERAPIST

## 2023-11-17 NOTE — PROGRESS NOTES
"Physical Therapy    Physical Therapy Treatment    Patient Name: Leslie Johnson  MRN: [id number]  Today's Date: 11/17/2023  Treatment Number: 7  Time Calculation  Start Time: 1113  Stop Time: 1200  Time Calculation (min): 47 min    Assessment:    Pt. Demonstrated improved quality of gait throughout session and self corrected for \"stiff knee\"gait with LLE.  The patient completed increased repetitions in all exercises today and demonstrated increased fluidity. Pt. Demonstrated increased attention to task during visit.  Pt. Is isolating left knee and hip flexion without hyperextension of the left knee with backwards ambulation.     Plan: lunging, step-ups laterally, stepping over objects, review HEP       Current Problem  1. Functional gait disorder        2. Gait abnormality                Subjective   Pt. Reports that she feels that she doesn't want to do the exercises on the sheet, she is only practicing the cues such as\" heel to toe\" and \"kicking a ball\".      Pain: numbness/tingling in Left lower leg, pt. Denies pain  Precautions: bipolar I disorder, trichotillomania, PDD, borderline intellecutal disability     Objective      Pt. Arrived to session with SPC, abnormal gait pattern, antalgic, inconsistent gait pattern, intermittently kicking lower extremities        Treatments:  There Ex:  Recumbent bike BLE's only at L 3.0mph for 10 minutes for promoting neuroplasticity    Step up/down laterally x20 to each side with occasional unilateral UE support x1     Neuro Re-Ed:    Forward lunges x20 each side lunging to blue tile placed on foam  Forward lunges x20 alternating side to blue tile placed on foam    Functional mobility with kicking soccer ball 30'x4    Ball kicks with soccer ball x20 on each side    Backwards walking 30\"x2    Lateral walking 20'x2 to each direction with PT demo    OP EDUCATION:      Goals:  Active       PT Problem       PT Goal 1       Start:  10/26/23       Plan of care is continued from [facility] " legacy documentation system.  In order to view goals for this plan of care please refer to legacy documentation system.

## 2023-11-21 ENCOUNTER — TREATMENT (OUTPATIENT)
Dept: PHYSICAL THERAPY | Facility: CLINIC | Age: 33
End: 2023-11-21
Payer: MEDICARE

## 2023-11-21 DIAGNOSIS — R26.9 FUNCTIONAL GAIT DISORDER: Primary | ICD-10-CM

## 2023-11-21 PROCEDURE — 97110 THERAPEUTIC EXERCISES: CPT | Mod: GP | Performed by: PHYSICAL THERAPIST

## 2023-11-21 PROCEDURE — 97112 NEUROMUSCULAR REEDUCATION: CPT | Mod: GP | Performed by: PHYSICAL THERAPIST

## 2023-11-21 NOTE — PROGRESS NOTES
"Physical Therapy    Physical Therapy Treatment    Patient Name: Leslie Johnson  MRN: [id number]  Today's Date: 11/21/2023  Treatment Number: 8  Time Calculation  Start Time: 1400  Stop Time: 1444  Time Calculation (min): 44 min    Assessment:    Pt. Continues to demonstrate improved fluidity during functional mobility. The patient is no longer utilizing her cane or walker which indicates good functional improvement.  Ms. Johnson preferred to complete step ups in sets of 10 this date.  Overall pt. Steadily progressing towards her long term goals.  Pt. Carried grocery bags up and down the stairs with reciprocal pattern with no loss of balance, pt. With slight knee hyperextension in stance phase, however good stability at independent level.      Plan: lunging, step-ups laterally, stepping over objects, review HEP       Current Problem  1. Functional gait disorder                Subjective   Pt. Reports that she prefers to bike for a lesser amount of time today because she wants to do \"other stuff\".  She feels good with her balance.  She is moving this Saturday.  She is no longer using her cane or walker, she is not bringing her walker or shower chair to her new apartment.    Pain: numbness/tingling in Left lower leg, pt. Denies pain  Precautions: bipolar I disorder, trichotillomania, PDD, borderline intellecutal disability     Objective      Pt. Arrived to session with SPC, abnormal gait pattern, antalgic, inconsistent gait pattern, intermittently kicking lower extremities        Treatments:  There Ex:  Recumbent bike BLE's only at L 3.0mph for 5 minutes for promoting neuroplasticity    Step up/down laterally x210 to each side with occasional unilateral UE support x3 sets each side     Grocery simulation carrying bags with weights (3 # each) up/down 3-6 inch steps x3 sets with reciprocal pattern at independent level    Reviewed goals for therapy for discharge including goal to attend 3 physical therapy visits with normal " "gait before, during and after.  Also discussed that patient has the choice to either discharge from therapy at any time or continue to work on her gait.    Neuro Re-Ed:    Forward lunges x20 each side lunging to blue tile placed on foam with counting  Forward lunges x20 alternating side to blue tile placed on foam with counting    Functional mobility with kicking soccer ball 30'x4    Ball kicks with pink stability ball x20 on each side    Functional mobility with pink stability ball kicks 25'x4    Backwards walking 20\"x4    Lateral walking 20'x2 to each direction with PT demo        OP EDUCATION:      Goals:  Active       PT Problem       PT Goal 1       Start:  10/26/23       Plan of care is continued from [facility] legacy documentation system.  In order to view goals for this plan of care please refer to legacy documentation system.                         "

## 2023-11-22 DIAGNOSIS — Z23 IMMUNIZATION DUE: Primary | ICD-10-CM

## 2023-11-24 ENCOUNTER — LAB (OUTPATIENT)
Dept: LAB | Facility: LAB | Age: 33
End: 2023-11-24
Payer: MEDICARE

## 2023-11-24 ENCOUNTER — APPOINTMENT (OUTPATIENT)
Dept: PHYSICAL THERAPY | Facility: CLINIC | Age: 33
End: 2023-11-24
Payer: MEDICARE

## 2023-11-24 DIAGNOSIS — Z23 IMMUNIZATION DUE: ICD-10-CM

## 2023-11-24 PROCEDURE — 86481 TB AG RESPONSE T-CELL SUSP: CPT

## 2023-11-24 PROCEDURE — 36415 COLL VENOUS BLD VENIPUNCTURE: CPT

## 2023-11-26 LAB
NIL(NEG) CONTROL SPOT COUNT: NORMAL
PANEL A SPOT COUNT: 0
PANEL B SPOT COUNT: 0
POS CONTROL SPOT COUNT: NORMAL
T-SPOT. TB INTERPRETATION: NEGATIVE

## 2023-12-01 DIAGNOSIS — R29.898 BILATERAL LEG WEAKNESS: ICD-10-CM

## 2023-12-01 RX ORDER — BACLOFEN 5 MG/1
TABLET ORAL
Qty: 810 TABLET | Refills: 0 | Status: SHIPPED | OUTPATIENT
Start: 2023-12-01 | End: 2024-01-24 | Stop reason: SDUPTHER

## 2023-12-21 ENCOUNTER — TREATMENT (OUTPATIENT)
Dept: PHYSICAL THERAPY | Facility: CLINIC | Age: 33
End: 2023-12-21
Payer: MEDICARE

## 2023-12-21 DIAGNOSIS — R26.9 GAIT ABNORMALITY: Primary | ICD-10-CM

## 2023-12-21 PROCEDURE — 97112 NEUROMUSCULAR REEDUCATION: CPT | Mod: GP | Performed by: PHYSICAL THERAPIST

## 2023-12-21 ASSESSMENT — PAIN - FUNCTIONAL ASSESSMENT: PAIN_FUNCTIONAL_ASSESSMENT: 0-10

## 2023-12-21 NOTE — PROGRESS NOTES
Physical Therapy    Physical Therapy Treatment    Patient Name: Leslie Johnson  MRN: [id number]  Today's Date: 12/21/2023  Treatment Number: 9  Time Calculation  Start Time: 1616  Stop Time: 1700  Time Calculation (min): 44 min    Assessment:   Progress check completed today to assess pt. Progress towards long term goals.  The patient has made good improvements with her functional mobility speed, static and dynamic balance.  Ms. Johnson improved on the FGA from a 12/30 to a 25/30, improving with vestibular challenges with gait, pivot turns, stepping over objects, backwards walking and stairs.  Pt. Continues to demonstrate left knee maintained in extension during swing phase of gait which is intermittent in nature.  This abberant movement was reduced the some of the dual motor tasks of the FGA.  Pt. Also demonstrates knee flexion during stair negotiation which is not consistent with her gait pattern over ground.   Overall, pt. Has made good progress. Plan on utilizing the next couple of visits to review home program.        Plan: lunging, step-ups laterally, stepping over objects, review HEP       Current Problem  1. Gait abnormality              Subjective   Pt. Reports that she didn't get the job in [place] because if transportation. She is going to look for another job.  She hasn't been needing to use the railing for the stairs.  She did hold onto the railing when it was snowing.      Pain: numbness/tingling in Left lower leg, pt. Denies pain  Precautions: bipolar I disorder, trichotillomania, PDD, borderline intellecutal disability     Objective      Pt. Arrived to session with SPC, abnormal gait pattern, antalgic, inconsistent gait pattern, intermittently kicking lower extremities        Modified Clinical Sensory Integration Test=pt. required VC for encouragement before balance on uneven surface  Condition One: Eyes Open, Firm Surface  Total Time: __30_____ / 30 sec  Condition Two: Eyes Closed, Firm Surface  Total  "Time: ___30____ / 30 sec  Condition Three: Eyes Open, Foam Surface  Total Time: _30______ / 30 sec  Condition Four: Eyes Closed, Foam Surface  Total Time: ___30____ / 30 sec    10MWT=  12/21/23=14 seconds   IE=18 seconds with no device  10MWT \"Fast\" pace =11 sec.    FGA  1.Gait level surface:2  2.Change in gait speed: 2  3.Gait with horizontal head turns: 2  4.Gait with vertical head turns: 3  5. Gait with pivot turn:3  6.Step over obstacle: 3  7.Gait with narrow CRISS: 3  8.Gait with eyes closed: eyes opened=2  9.Ambulating backwards: 2  10. stairs: 3   25/30  IE=Total score: 12/30    Score of 22 or less is considered predictor for falls compared to normative values (Kalin and Luis Felipe, 2010, n=35; aged 60-90)    Treatments:    Neuro Re-Ed:    FGA  1.Gait level surface:  2.Change in gait speed:  3.Gait with horizontal head turns:  4.Gait with vertical head turns:  5. Gait with pivot turn:  6.Step over obstacle:  7.Gait with narrow CRISS:  8.Gait with eyes closed:  9.Ambulating backwards:  10. stairs:    FT EO on firm surface  FT EC on firm surface  FT EO on foam  FT EC on foam    Functional mobility 30'x2 reps        OP EDUCATION:      Goals:  Active       PT Problem       PT Goal 1       Start:  10/26/23       Plan of care is continued from [facility] legacy documentation system.  In order to view goals for this plan of care please refer to legacy documentation system.                           "

## 2023-12-28 ENCOUNTER — APPOINTMENT (OUTPATIENT)
Dept: PHYSICAL THERAPY | Facility: CLINIC | Age: 33
End: 2023-12-28
Payer: MEDICARE

## 2024-01-02 ENCOUNTER — TELEPHONE (OUTPATIENT)
Dept: NEUROLOGY | Facility: CLINIC | Age: 34
End: 2024-01-02
Payer: MEDICARE

## 2024-01-04 ENCOUNTER — TREATMENT (OUTPATIENT)
Dept: PHYSICAL THERAPY | Facility: CLINIC | Age: 34
End: 2024-01-04
Payer: MEDICARE

## 2024-01-04 DIAGNOSIS — R26.89 IMBALANCE: ICD-10-CM

## 2024-01-04 DIAGNOSIS — R26.9 GAIT ABNORMALITY: Primary | ICD-10-CM

## 2024-01-04 PROCEDURE — 97110 THERAPEUTIC EXERCISES: CPT | Mod: GP | Performed by: PHYSICAL THERAPIST

## 2024-01-04 PROCEDURE — 97112 NEUROMUSCULAR REEDUCATION: CPT | Mod: GP | Performed by: PHYSICAL THERAPIST

## 2024-01-04 ASSESSMENT — PAIN - FUNCTIONAL ASSESSMENT: PAIN_FUNCTIONAL_ASSESSMENT: 0-10

## 2024-01-04 ASSESSMENT — PAIN SCALES - GENERAL: PAINLEVEL_OUTOF10: 0 - NO PAIN
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
## 2024-01-10 ENCOUNTER — APPOINTMENT (OUTPATIENT)
Dept: PRIMARY CARE | Facility: CLINIC | Age: 34
End: 2024-01-10
Payer: MEDICARE

## 2024-01-11 ENCOUNTER — TREATMENT (OUTPATIENT)
Dept: PHYSICAL THERAPY | Facility: CLINIC | Age: 34
End: 2024-01-11
Payer: MEDICARE

## 2024-01-11 DIAGNOSIS — R26.9 GAIT ABNORMALITY: Primary | ICD-10-CM

## 2024-01-11 PROCEDURE — 97110 THERAPEUTIC EXERCISES: CPT | Mod: GP | Performed by: PHYSICAL THERAPIST

## 2024-01-11 NOTE — PROGRESS NOTES
"Physical Therapy    Physical Therapy Treatment/Discharge    Patient Name: Leslie Johnson  MRN: [id number]  Today's Date: 1/11/2024  Treatment Number: 10  Time Calculation  Start Time: 1147  Stop Time: 1232  Time Calculation (min): 45 min  Assessment:    Today was the patient's last visit and discharge day. The patient has met the majority of her long term goals including improving her gait speed and dynamic balance.  Progress check and functional outcome measures were assessed on 12/21/23, to see results please refer to 12/21/23  treatment note.  Ms. Johnson is presenting with increased fluidity during gait, reduced abberant movement during swing phase which usually consisted of left knee held in extension during swing phase of gait. Due to pt. Preferring not do perform HEP exercises such as strengthening in past visits I encouraged pt. To perform some of the activities that she has performed in the clinic including lunges onto blue sensory mat and a dorsiflexion stretch to assist with stiffness in her left ankle.  I also encouraged the patient to perform treadmill ambulation every other day to improve endurance and follow guidelines for exercise according to the [employer].  At this time the patient is ready to discharge from physical therapy services.      Plan: discharge     Current Problem  1. Gait abnormality              Subjective   Pt. Reports that she tried the treadmill 3 separate. On Tuesday she went 3 separate times that day.  She did two sets of 10.  She adjusted the speed and went to 1.8mph-2.0mph. Recently she went to the grocery store and it didn't bother her that day, she had some heavy groceries and had no pain. She doesn't have any pain today in her ankle, maybe mild.  The last day she did the treadmill was Tuesday and went 5 minutes at 15\" total and went later on the evening 2 sets of 10.  Pt. Peer support \"Zoë\" was present during full visit.     Pain: 0/10  Precautions: bipolar I " "disorder, trichotillomania, PDD, borderline intellecutal disability     Objective      Pt. Arrived to session with SPC, abnormal gait pattern, antalgic, inconsistent gait pattern, intermittently kicking lower extremities      Treatments:    There Ex:  Treadmill ambulation 10'x1 with BUE support at 1.8mph at a conversational pace with good fluidity noted, no visible hyperextension at knee  Educated pt.on HEP including treadmill training if she prefers, at every other day to break it up and build endurance  Gastroc stretch with back LE on blue mat 30\"x3 each side  Quad stretch 30\"x1 on R side (discontinued due to pt. Not feeling stretch)  PT demo for lunges F and laterally which can be done at home   Peer Support \"Zoë\" took video of gastroc stretch for pt. Reference at home    OP EDUCATION: Educated pt. On continuing activities that were practiced in therapy, pt. Having met her goals and plan to discharge after today's visit.     Goals:  Active       PT Problem       PT Goal 1       Start:  10/26/23       Plan of care is continued from [facility] legacy documentation system.  In order to view goals for this plan of care please refer to legacy documentation system.          Aerobic Capacity/Endurance: The 6MWT will be performed in next 1-2 visits. GOAT MET  Balance: Pt. miguel improve FGA by 4 points by the end of the POC in order to meet MCID compared to normative values. GOAL MET  Gait/Locomotion: Pt. will demonstrate improved fluidity of gait, equal step length without an assistive device by end of POC. GOAL PARTIALLY MET  Self Care Skills: Pt. will be independent in HEP with supervision of a family member or friend in order to reach all of her long term goals. GOAL NOT MET  , Pt. will improve 10MWT by.10 m/s in order to meet cut off for substantial meaningful change by end of POC. GOAL MET  , Pt. will be independent with utilizing proper techniques to implement proper gait pattern such as relaxation/distractive mechanisms. "  GOAL MET

## 2024-01-15 ENCOUNTER — APPOINTMENT (OUTPATIENT)
Dept: PRIMARY CARE | Facility: CLINIC | Age: 34
End: 2024-01-15
Payer: MEDICARE

## 2024-01-22 ENCOUNTER — APPOINTMENT (OUTPATIENT)
Dept: PRIMARY CARE | Facility: CLINIC | Age: 34
End: 2024-01-22
Payer: MEDICARE

## 2024-01-24 ENCOUNTER — OFFICE VISIT (OUTPATIENT)
Dept: NEUROLOGY | Facility: CLINIC | Age: 34
End: 2024-01-24
Payer: MEDICARE

## 2024-01-24 VITALS
BODY MASS INDEX: 28.71 KG/M2 | DIASTOLIC BLOOD PRESSURE: 74 MMHG | HEART RATE: 74 BPM | SYSTOLIC BLOOD PRESSURE: 111 MMHG | WEIGHT: 156 LBS | HEIGHT: 62 IN

## 2024-01-24 DIAGNOSIS — M62.89 MUSCLE STIFFNESS: ICD-10-CM

## 2024-01-24 DIAGNOSIS — R26.9 GAIT DISTURBANCE: Primary | ICD-10-CM

## 2024-01-24 PROCEDURE — 99214 OFFICE O/P EST MOD 30 MIN: CPT | Performed by: PSYCHIATRY & NEUROLOGY

## 2024-01-24 PROCEDURE — 1036F TOBACCO NON-USER: CPT | Performed by: PSYCHIATRY & NEUROLOGY

## 2024-01-24 RX ORDER — BACLOFEN 5 MG/1
5 TABLET ORAL 2 TIMES DAILY
Qty: 180 TABLET | Refills: 1 | Status: SHIPPED | OUTPATIENT
Start: 2024-01-24 | End: 2024-07-22

## 2024-01-24 NOTE — PROGRESS NOTES
"Subjective     Leslie Johnson is a 33 y.o. year old female seen in follow-up for gait disturbance.    HPI    33-year-old left-handed  woman with past medical history significant for high functioning autism, ADHD, bipolar affective disorder, anxiety.    I evaluated her initially and most recently on 5/30/2023.  As detailed in my ambulatory EMR note from that date she presented with a gait disorder questionably related to neuroleptic therapy (Zyprexa and Latuda), with remote history of a similar gait disturbance after being placed on Abilify which ultimately resolved after several weeks.  She had already seen movement disorders with impression of a functional gait disturbance.  An EMG of the right lower extremity in 2017 was normal.  She complained of a sense of weakness in the left lower extremity.  She had seen physical medicine and rehabilitation and had undergone physical therapy.    I did not get an impression of parkinsonism or other obvious neuroleptic induced basis to her gait disturbance.  It had already been recommended that she undergo MRIs of the brain and cervical spine but she did not wish to consider doing so.  I sent her for labs including vitamin B12 level and serum CK.  B12 level came back low normal at 252 for which I recommended oral replacement.  CK was normal.  I provided an order for another course of physical therapy.  I advised her to continue using a cane or walker as needed to stabilize her gait and reduce risk of falls.  I discussed consideration of a CT of the spine if she did not improve significantly with physical therapy.    She is evaluated again today in the office.  She is again accompanied to the visit by her mother.    She has done physical therapy and I received a communication from the physical therapist indicating that her PT evaluation supported the movement disorders impression of functional gait disturbance.    She feels her gait and balance are improved but \"not " "100%\".  She does feel that she made progress with physical therapy.  She is walking today without assistive devices.  She denies interval falls.  She does some home exercise including on the treadmill.    She continues to feel a sense of stiffness in the left leg specifically, mainly around the knee.  She is taking baclofen 5 mg 3 times daily prescribed by physiatry but feels that it is too strong.  It makes her a bit drowsy and she suspects that is also causing constipation.    She does not definitely perceive weakness in the legs such as any limiting difficulty arising from chairs or climbing stairs.  She feels that her  strength is reasonably good such as for opening water bottles.  She does not perceive persistent numbness in hands or feet.    She indicates that she has seen a psychiatrist and it is not clear when she is following up next.  She indicates an impression that either neuroleptic medication or oxcarbazepine had a negative effect on her gait.    She is on B12 replacement 2000 mcg orally daily.       Review of Systems    As per the history of present illness    Patient Active Problem List   Diagnosis    Anemia    Anxiety, generalized    Asperger syndrome    PDD (pervasive developmental disorder)    Attention deficit hyperactivity disorder (ADHD)    Encounter for screening for HIV    Abnormal menstrual periods    Amenorrhea    Anxiety    Bilateral leg weakness    Bilateral myopia    Bipolar I disorder (CMS/HCC)    Borderline intellectual disability    Borderline intellectual functioning    Choroidal nevus of right eye    Depression    Dystonia    Drug-induced parkinsonism (CMS/HCC)    Elevated liver enzymes    Frequent falls    Functional gait disorder    GERD (gastroesophageal reflux disease)    Hemorrhoids    Hirsutism    Low vitamin B12 level    LGSIL on Pap smear of cervix    Hyperreflexia    Imbalance    Muscle spasm    Obesity    Paresthesia of right foot    Proctalgia    Reduced sensation    " Severe bipolar I disorder, current or most recent episode mixed (CMS/[facility])    Zyprexa adverse reaction    Trichotillomania    Subacute dyskinesia due to drug    Chronic pain of both knees    Gait abnormality    Immunization due     Past Medical History:   Diagnosis Date    Acute candidiasis of vulva and vagina 02/08/2015    Vaginitis due to Candida albicans    Dysuria 02/08/2015    Dysuria    Encounter for gynecological examination (general) (routine) without abnormal findings 04/18/2016    Visit for gynecologic examination    Personal history of diseases of the skin and subcutaneous tissue 04/17/2017    History of acute dermatitis    Personal history of other diseases of the female genital tract 12/21/2015    History of vaginitis    Personal history of other diseases of the female genital tract 12/21/2015    History of vaginal pruritus    Personal history of other diseases of the female genital tract 02/08/2015    History of vaginal discharge    Personal history of other specified conditions 09/01/2021    History of fatigue    Personal history of other specified conditions 11/19/2015    History of nausea    Personal history of other specified conditions 12/21/2015    History of dysuria    Urinary tract infection, site not specified 12/22/2021    Acute UTI    Xerosis cutis 05/13/2016    Dry skin     Past Surgical History:   Procedure Laterality Date    OTHER SURGICAL HISTORY  08/10/2015    Complete Permanent Excision Of Nail And Matrix Toes     Social History     Tobacco Use    Smoking status: Never     Passive exposure: Never    Smokeless tobacco: Never   Substance Use Topics    Alcohol use: Yes     Comment: ocassionally     family history includes Hypertension in her mother.    Current Outpatient Medications:     acetaminophen (Tylenol) 500 mg tablet, Take 1 tablet (500 mg) by mouth every 6 hours if needed for headaches or moderate pain (4 - 6)., Disp: 60 tablet, Rfl: 3    amphetamine-dextroamphetamine (Adderall)  10 mg tablet, TAKE 1 TABLET BY MOUTH AFTER LUNCH DAILY, Disp: , Rfl:     amphetamine-dextroamphetamine XR (Adderall XR) 20 mg 24 hr capsule, TAKE 1 CAPSULE DAILY IN THE MORNING FOR ADHD ( F90.2)., Disp: , Rfl:     baclofen (Lioresal) 5 mg tablet, 3 po TID, Disp: 810 tablet, Rfl: 0    benztropine (Cogentin) 1 mg tablet, Take by mouth. Every 1 day at 09:00, Disp: , Rfl:     benztropine (Cogentin) 2 mg tablet, Take by mouth. Every 1 day at 09:00, Disp: , Rfl:     citalopram (CeleXA) 20 mg tablet, Take 1 tablet (20 mg) by mouth once daily at bedtime., Disp: , Rfl:     clonazePAM (KlonoPIN) 1 mg tablet, Take by mouth., Disp: , Rfl:     clotrimazole (Lotrimin) 1 % vaginal cream, Insert 1 applicator into the vagina once daily at bedtime., Disp: , Rfl:     Colace 100 mg capsule, Take 1 capsule (100 mg) by mouth 2 times a day., Disp: , Rfl:     diclofenac sodium (Voltaren) 1 % gel gel, APPLY 1 APPLICATION TOPICALLY 2 TIMES A DAY. APPLY SPARINGLY TO AFFECTED AREA, Disp: 100 g, Rfl: 2    diclofenac sodium (Voltaren) 1 % gel gel, Apply 1 Application topically 4 times a day., Disp: 150 g, Rfl: 3    dimethicone-colloidal oatmeaL (Aveeno Daily Moisturizing) 1.2 % lotion, Apply 1 Application topically every 1 hour if needed (prn)., Disp: 227 mL, Rfl: 5    divalproex (Depakote ER) 250 mg 24 hr tablet, Take 1 Tab by mouth See admin instructions. Take 250 mg twice per day for three days, then increase to 500 mg twice per day., Disp: , Rfl:     divalproex (Depakote ER) 500 mg 24 hr tablet, Take 4 tablets (2,000 mg) by mouth once daily at bedtime., Disp: , Rfl:     divalproex sodium (DEPAKOTE ORAL), Depakote, Disp: , Rfl:     fluconazole (Diflucan) 150 mg tablet, Take 1 tablet (150 mg) by mouth once daily., Disp: , Rfl:     hydrOXYzine HCL (Atarax) 25 mg tablet, Take 1 tablet (25 mg) by mouth once daily. 1 before bedtime, Disp: 90 tablet, Rfl: 1    lamoTRIgine (LaMICtal) 25 mg tablet, Take one at bedtime for one week, then one twice/day  for one week, then one in am and two at bedtime for one week, then two twice per day, Disp: , Rfl:     lithium 300 mg capsule, Take 1 capsule (300 mg) by mouth 2 times a day., Disp: , Rfl:     lurasidone (Latuda) 40 mg tablet, Take 1 tablet (40 mg) by mouth once daily., Disp: , Rfl:     medroxyPROGESTERone 150 mg/mL injection, Inject 1 mL (150 mg) into the muscle every 3 (three) months., Disp: , Rfl:     medroxyprogesterone acetate (DEPO-PROVERA IM), Depo-Provera, Disp: , Rfl:     mineral oil-isopropyl myristat lotion, Apply topically if needed., Disp: , Rfl:     Mydayis 25 mg 24 hr capsule, Take 1 capsule (25 mg) by mouth once daily in the morning., Disp: , Rfl:     Mydayis 37.5 mg 24 hr capsule, Take 1 capsule (37.5 mg) by mouth once daily in the morning., Disp: , Rfl:     norgestimate-ethinyl estradioL (Sprintec, 28,) 0.25-35 mg-mcg tablet, Take 1 tablet by mouth once daily. Take at the same time each day., Disp: , Rfl:     OXcarbazepine (Trileptal) 150 mg tablet, Take by mouth., Disp: , Rfl:     OXcarbazepine (Trileptal) 300 mg tablet, Take 1 tablet (300 mg) by mouth 2 times a day., Disp: , Rfl:     pantoprazole (ProtoNix) 20 mg EC tablet, Take by mouth., Disp: , Rfl:     polyethylene glycol (Glycolax, Miralax) 17 gram/dose powder, TAKE HALF CAP IN LIQUID ONCE DAILY - AS NEEDED FOR CONSTIPATION., Disp: 510 g, Rfl: 3    QUEtiapine XR (SEROquel XR) 400 mg 24 hr tablet, Take 2 tablets (800 mg) by mouth once daily in the evening., Disp: , Rfl:     sertraline (Zoloft) 100 mg tablet, Take 1 tablet (100 mg) by mouth once daily., Disp: , Rfl:     Vitamin B-12 1,000 mcg tablet, Take 2 tablets (2,000 mcg) by mouth once daily., Disp: , Rfl:     Vyvanse 30 mg capsule, Take 1 capsule (30 mg) by mouth once daily in the morning., Disp: , Rfl:   Allergies   Allergen Reactions    Lurasidone Other and Unknown    Olanzapine Other and Unknown    Risperidone Other and Unknown    Aripiprazole Other and Unknown     Legs were  paralized    weakness    Cariprazine Unknown    Quetiapine Unknown    Ziprasidone Hcl Unknown     Not sure       Objective   Neurological Exam  Physical Exam    Physical Examination:    General: Alert woman who was ambulatory without assistive devices.      Mental Status: Clear sensorium without fluctuation.  Pressured speech and frequent interruptions of examiner.  Fluent unremarkable language without paraphasic errors.    Cranial Nerves: No gaze deviation or ptosis.  Symmetric facial motor function.  Grossly intact hearing.  No dysarthria or dysphonia.    Motor: Muscle bulk and tone were normal throughout with the exception of mildly increased tone about the left knee, versus incomplete relaxation.  I did not note this phenomenon at the right knee.  There was no pronator drift or asymmetry of finger taps.  Confrontation strength was symmetrically 5/5 throughout.    Coordination: There was no postural or rest tremor, myoclonus or dystonic posturing.  Foot taps were symmetrically rapid and regular.    Tendon Reflexes: Symmetrically 2+ 2+ biceps and brachioradialis, trace triceps, 2+ patellar and ankles.    Sensation: Romberg sign was absent.     Station: Intact and stable.    Gait: Stable and unremarkable except for a mild limp that appeared referable to the left lower extremity.  Intact tandem.      Assessment/Plan     I reviewed suspicion of a functional basis for her gait disturbance with the patient and her mother, but they did remain focused on the possibility of this being neuroleptic related.  I discussed that the movement disorders subspecialist did not suspecting neuroleptic related basis.    The only notable finding on her exam today, apart from a mildly limping quality to the gait, is question of mildly increased tone at the left knee.    I revisited the previous recommendation for neuroimaging.  She did undergo a head CT outside the  system on 6/29/2023 showing (by report) no notable parenchymal  findings and no abnormality of the ventricular system.  The images were not available for review at the time of my visit with her.  She did not wish to consider MRIs due to the requirement that she would have to remove her piercings.  Accordingly I recommended a CT of the thoracic spine.    My office will contact her with CT results.    She indicated difficulty with baclofen even at the present low dose of 5 mg 3 times daily in terms of causing mild drowsiness and possibly contributing to constipation.  Accordingly I will reduce the dose further to 5 mg twice daily.    I recommended that she continue regular follow-up with psychiatry.    I advised her to follow-up in the office in 1 year.

## 2024-01-24 NOTE — PATIENT INSTRUCTIONS
As we discussed, your gait appears somewhat improved compared to the last visit but you are still bothered by stiffness in the left leg and there is still a limping appearance when you walk.    To be thorough I recommend getting a CT of the thoracic spine.  I will call with results.    You expressed concern about side effects of baclofen and I recommend reducing the dose to 5 mg twice daily.  I sent in an order for the new baclofen regimen to your [facility] in [place].    We discussed the importance of continued follow-up with psychiatry.    Please see me in the office in 1 year.  
movement

## 2024-02-05 ENCOUNTER — APPOINTMENT (OUTPATIENT)
Dept: PHYSICAL MEDICINE AND REHAB | Facility: CLINIC | Age: 34
End: 2024-02-05
Payer: MEDICARE

## 2024-02-06 ENCOUNTER — HOSPITAL ENCOUNTER (OUTPATIENT)
Dept: RADIOLOGY | Facility: CLINIC | Age: 34
Discharge: HOME | End: 2024-02-06
Payer: MEDICARE

## 2024-02-06 DIAGNOSIS — M62.89 MUSCLE STIFFNESS: ICD-10-CM

## 2024-02-06 DIAGNOSIS — R26.9 GAIT DISTURBANCE: ICD-10-CM

## 2024-02-06 PROCEDURE — 72128 CT CHEST SPINE W/O DYE: CPT | Performed by: RADIOLOGY

## 2024-02-06 PROCEDURE — 72128 CT CHEST SPINE W/O DYE: CPT

## 2024-02-07 ENCOUNTER — TELEPHONE (OUTPATIENT)
Dept: NEUROLOGY | Facility: CLINIC | Age: 34
End: 2024-02-07
Payer: MEDICARE

## 2024-02-07 NOTE — TELEPHONE ENCOUNTER
----- Message from Yong Gaspar MD sent at 2/7/2024  2:18 PM EST -----  Please inform her mother that I reviewed the thoracic spine CT and it shows no concerning findings and nothing that would explain her walking or leg issues.  ----- Message -----  From: Abhilash An  Sent: 2/6/2024   3:06 PM EST  To: Yong Gaspar MD    Patient wants you to notify mother when you call with results for CT that was done   ----- Message -----  From: Esperanza Poe  Sent: 2/6/2024   1:20 PM EST  To: bAhilash An    Went today to do the CT Scan, want's mother Eve Stone (legal guardian) to know results as well  at [phone].  Leslie Johnson patient  #[phone].

## 2024-03-06 DIAGNOSIS — F41.1 ANXIETY, GENERALIZED: ICD-10-CM

## 2024-03-06 DIAGNOSIS — L85.3 DRY SKIN: ICD-10-CM

## 2024-03-06 DIAGNOSIS — K59.00 CONSTIPATION, UNSPECIFIED CONSTIPATION TYPE: ICD-10-CM

## 2024-03-08 RX ORDER — DIMETHICONE/COLLOIDAL OATMEAL 1.25 %
1 LOTION (ML) TOPICAL
Qty: 227 ML | Refills: 5 | Status: SHIPPED | OUTPATIENT
Start: 2024-03-08

## 2024-03-08 RX ORDER — POLYETHYLENE GLYCOL 3350 17 G/17G
POWDER, FOR SOLUTION ORAL
Qty: 510 G | Refills: 3 | Status: SHIPPED | OUTPATIENT
Start: 2024-03-08

## 2024-03-08 RX ORDER — HYDROXYZINE HYDROCHLORIDE 25 MG/1
25 TABLET, FILM COATED ORAL DAILY
Qty: 90 TABLET | Refills: 1 | Status: SHIPPED | OUTPATIENT
Start: 2024-03-08

## 2024-03-11 ENCOUNTER — TELEPHONE (OUTPATIENT)
Dept: PRIMARY CARE | Facility: CLINIC | Age: 34
End: 2024-03-11
Payer: MEDICARE

## 2024-03-11 DIAGNOSIS — L85.3 DRY SKIN: Primary | ICD-10-CM

## 2024-03-11 NOTE — TELEPHONE ENCOUNTER
Pharmacy called stating that the aveeno lotion is not covered by the pt insurance they are wondering if you could send in an alternative please advise.

## 2024-03-12 DIAGNOSIS — L85.3 DRY SKIN: ICD-10-CM

## 2024-03-16 RX ORDER — VITAMIN E/VITAMINS A AND D
CREAM (GRAM) TOPICAL
Qty: 237 G | Refills: 5 | Status: SHIPPED | OUTPATIENT
Start: 2024-03-16

## 2024-08-15 ENCOUNTER — APPOINTMENT (OUTPATIENT)
Dept: OPHTHALMOLOGY | Facility: CLINIC | Age: 34
End: 2024-08-15
Payer: MEDICARE

## 2024-10-17 ENCOUNTER — APPOINTMENT (OUTPATIENT)
Dept: OPHTHALMOLOGY | Facility: CLINIC | Age: 34
End: 2024-10-17
Payer: MEDICARE

## 2024-10-17 DIAGNOSIS — D31.31 CHOROIDAL NEVUS OF RIGHT EYE: ICD-10-CM

## 2024-10-17 DIAGNOSIS — H52.13 BILATERAL MYOPIA: Primary | ICD-10-CM

## 2024-10-17 PROCEDURE — 92014 COMPRE OPH EXAM EST PT 1/>: CPT | Performed by: OPTOMETRIST

## 2024-10-17 PROCEDURE — 92015 DETERMINE REFRACTIVE STATE: CPT | Performed by: OPTOMETRIST

## 2024-10-17 ASSESSMENT — TONOMETRY
IOP_METHOD: GOLDMANN APPLANATION
OD_IOP_MMHG: 14
OS_IOP_MMHG: 14

## 2024-10-17 ASSESSMENT — CONF VISUAL FIELD
OD_NORMAL: 1
OD_INFERIOR_TEMPORAL_RESTRICTION: 0
OS_NORMAL: 1
OS_SUPERIOR_TEMPORAL_RESTRICTION: 0
OS_INFERIOR_NASAL_RESTRICTION: 0
OD_INFERIOR_NASAL_RESTRICTION: 0
OD_SUPERIOR_TEMPORAL_RESTRICTION: 0
OS_INFERIOR_TEMPORAL_RESTRICTION: 0
OS_SUPERIOR_NASAL_RESTRICTION: 0
OD_SUPERIOR_NASAL_RESTRICTION: 0

## 2024-10-17 ASSESSMENT — SLIT LAMP EXAM - LIDS
COMMENTS: GOOD POSITION
COMMENTS: GOOD POSITION

## 2024-10-17 ASSESSMENT — VISUAL ACUITY
OD_CC: 20/20
OS_CC: 20/20
METHOD: SNELLEN - LINEAR

## 2024-10-17 ASSESSMENT — EXTERNAL EXAM - RIGHT EYE: OD_EXAM: NORMAL

## 2024-10-17 ASSESSMENT — REFRACTION_WEARINGRX
SPECS_TYPE: SVL
OS_SPHERE: -4.00
OS_CYLINDER: SPHER
OD_SPHERE: -3.50
OD_CYLINDER: SPHER

## 2024-10-17 ASSESSMENT — ENCOUNTER SYMPTOMS: EYES NEGATIVE: 1

## 2024-10-17 ASSESSMENT — REFRACTION_MANIFEST
OD_SPHERE: -4.00
OS_SPHERE: -4.50
OD_CYLINDER: SPHERE
OS_CYLINDER: SPHERE

## 2024-10-17 ASSESSMENT — CUP TO DISC RATIO
OD_RATIO: .25
OS_RATIO: .25

## 2024-10-17 ASSESSMENT — REFRACTION
OD_CYLINDER: SPHER
OS_SPHERE: -4.00
OS_CYLINDER: SPHER
OD_SPHERE: -3.50

## 2024-10-17 ASSESSMENT — EXTERNAL EXAM - LEFT EYE: OS_EXAM: NORMAL

## 2024-10-17 NOTE — PROGRESS NOTES
A spectacle prescription was dispensed to be used as needed. New Rx for glasses given per patient request. Patient's signature obtained to acknowledge and confirm that a paper copy of glasses Rx was given to patient in compliance with Count includes the [facility] Eyeglass Rule. Electronic copy of Rx will also be available via [employer].      VA corrects to 20/20 OD and OS. Small CN nasal to TAIME OD flat no pigment no halo no vessels small. Reviewed findings. Pt has anxiety. The   patient was asked to return to our clinic in one year or sooner if ocular or vision changes occur

## 2024-11-14 ENCOUNTER — TELEPHONE (OUTPATIENT)
Dept: OPHTHALMOLOGY | Facility: CLINIC | Age: 34
End: 2024-11-14
Payer: MEDICARE

## 2025-03-07 ENCOUNTER — APPOINTMENT (OUTPATIENT)
Dept: PHYSICAL THERAPY | Facility: CLINIC | Age: 35
End: 2025-03-07
Payer: MEDICARE

## 2025-03-13 ENCOUNTER — APPOINTMENT (OUTPATIENT)
Dept: PHYSICAL THERAPY | Facility: CLINIC | Age: 35
End: 2025-03-13
Payer: MEDICARE

## 2025-03-24 ENCOUNTER — APPOINTMENT (OUTPATIENT)
Dept: PHYSICAL THERAPY | Facility: CLINIC | Age: 35
End: 2025-03-24
Payer: MEDICARE

## 2025-03-28 ENCOUNTER — APPOINTMENT (OUTPATIENT)
Dept: PHYSICAL THERAPY | Facility: CLINIC | Age: 35
End: 2025-03-28
Payer: MEDICARE

## 2025-04-21 ENCOUNTER — EVALUATION (OUTPATIENT)
Dept: PHYSICAL THERAPY | Facility: CLINIC | Age: 35
End: 2025-04-21
Payer: MEDICARE

## 2025-04-21 VITALS — HEART RATE: 83 BPM | SYSTOLIC BLOOD PRESSURE: 109 MMHG | DIASTOLIC BLOOD PRESSURE: 65 MMHG

## 2025-04-21 DIAGNOSIS — R26.9 GAIT ABNORMALITY: Primary | ICD-10-CM

## 2025-04-21 DIAGNOSIS — R26.9 UNSPECIFIED ABNORMALITIES OF GAIT AND MOBILITY: ICD-10-CM

## 2025-04-21 DIAGNOSIS — M54.50 LOW BACK PAIN: ICD-10-CM

## 2025-04-21 PROCEDURE — 97161 PT EVAL LOW COMPLEX 20 MIN: CPT | Mod: GP | Performed by: PHYSICAL THERAPIST

## 2025-04-21 PROCEDURE — 97110 THERAPEUTIC EXERCISES: CPT | Mod: GP | Performed by: PHYSICAL THERAPIST

## 2025-04-21 PROCEDURE — 97162 PT EVAL MOD COMPLEX 30 MIN: CPT | Mod: GP | Performed by: PHYSICAL THERAPIST

## 2025-04-21 ASSESSMENT — PAIN SCALES - GENERAL: PAINLEVEL_OUTOF10: 0 - NO PAIN

## 2025-04-21 ASSESSMENT — PAIN - FUNCTIONAL ASSESSMENT: PAIN_FUNCTIONAL_ASSESSMENT: 0-10

## 2025-04-21 NOTE — PROGRESS NOTES
"Physical Therapy    Physical Therapy Evaluation and Treatment      Patient Name: Leslie Johnson  MRN: [id number]  Today's Date: 4/24/2025  Medicare  POC end date: 6/19/25  Primary dx= gait abnormality   Time Entry:   Time Calculation  Start Time: 1515  Stop Time: 1620  Time Calculation (min): 65 min  PT Evaluation Time Entry  PT Evaluation (Moderate) Time Entry: 35  PT Therapeutic Procedures Time Entry  Therapeutic Exercise Time Entry: 30       Assessment:  Patient is a 34 year old female referred to [facility]'s outpatient physical therapy services for gait abnormality.  Ms. Johnson is known to this facility and was last discharged from my care on 1/11/24.  Pt. Reports that a couple of months ago she felt that her gait was off, however upon evaluation today pt. [place] that her gait was back to normal and appeared at baseline upon my observation.  Pt. Thinks that this gait issue may have been due to one of her medications.  The patient demonstrated good score on 5x sit to stand test today.  Pt. Scored a 23/30 on FGA scoring above fall risk cut off score.  Pt. Was challenged with head turns and vertical nods in addition to backwards walking.  Pt. Reports that she feels back to normal and doesn't feel that she needs therapy for her gait at this time.  Patient advocate \"Lizeth\" was present during visit and notified PT that the patient has been reporting intermittent low back pain. The patient reported reduced low back pain with repeated extension. Therefore provided pt. With extension based exercises for HEP.  PT suggested pt. Return for 1-2 additional visits to review HEP, pt. Verbally agreed with this plan.        Plan: review HEP  OP PT Plan  Treatment/Interventions: Cryotherapy, Education/ Instruction, Gait training, Hot pack, Manual therapy, Self care/ home management, Therapeutic activities, Therapeutic exercises, Taping techniques  PT Plan: Skilled PT  PT Frequency: 1 time per week  Duration: 2-3 visits  Onset " "Date: 02/18/25  Certification Period Start Date: 04/20/25  Certification Period End Date: 06/19/25  Rehab Potential: Good  Plan of Care Agreement: Patient    Current Problem:   1. Gait abnormality        2. Unspecified abnormalities of gait and mobility  Referral to Physical Therapy    Follow Up In Physical Therapy      3. Low back pain            Subjective    Patient Report:  Pt. Reports that she had abnormal gait disorder but it seemed to go away.  Pt. Support \"Lizeth\" available during subjective portion of examination per patient request.  Pt. Had a side effect from one of her medications and her gait was off but it went away, she had to go off of this and now she is doing better.  She sees Dr. Miranda [facility].  She also has Dr. Burgess is through [facility]. She had some lower back pain per \"Lizeth\".  It has been going on for 2 weeks.  She gets stomach cramps and diarrhea and she thinks it is from the topamax.  She had dizziness in February but never found out what was wrong, she needs to get blood work.  She had to get a new .  She has a counselor through the [facility], her name is Suzy Wilks.  Her  is \"Janee Hernandez\" through [facility].  She has surface support  through [facility] who is Monica Ken.     Home Setup:  lives on 2nd floor program  Hobbies: used to do bowling but she is done but it is going to Saturdays but she doesn't want to do that.  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 Interested with [facility] at [place]  Sleep: sleeping not great  Falls: one fall when she was playing putput, scraped her knees and elbow   Hydration: drinking a lot of water, she thinks she might have a UTI.   Pt. Goal: would like \"to get reassessed today\"       Precautions: hx. Severe bipolar disorder, " "depression, anxiety, autism spectrum     Vital Signs: 109/65 sitting BP electronic cuff, HR=83 BPM     Pain: 0/10        Objective   Posture=mildly rounded shoulders/forward head  Vision=glasses   Hearing=WNL  Sensation=WNL  Gait=normal speed, reciprocal pattern, no significant gait deviations noted    Lower Extremity Strength MMT (tested in seated)    Left Right   Hip flexion 5/5 5/5   Hip ADD  5/5 5/5   Hip ABD 5/5 5/5   Knee flexion 5/5 5/5   Knee Extension 5/5 5/5   Ankle Dorsiflexion 5/5 5/5   Ankle Plantarflexion 5/5 5/5   Ankle Inversion 5/5 5/5   Ankle Eversion 5/5 5/5        SLS:  RLE=19 sec.  LLE=9 sec.     ABC=1030 (64%)    Lumbar AROM:  Flexion=70'  Extension=30'  R rotation=dec. 25%   L rotation=WNL    Repeated lumbar extension=dec. Pain however pt. Reported more relief than flexion  Repeated lumbar flexion=dec pain.         Outcome Measures:  FGA=23/30  5x sit<>Stand test=12 sec. No UE support  30 second sit to stand test= 14 reps with no UE support  *ABC functional outcome measure=not scored due to pt. Filling out only 2/16 of the questions     Treatments:    There Ex:  Prone press-ups x10  Standing lumbar extensions   Xena pose 30\"x1  Provided pt. With printed handout for HEP    EDUCATION:  -see treatment/flowsheet       Goals:  Active       PT Problem       PT Goal 1       Start:  04/24/25    Expected End:  06/19/25       Pt. Will be independent in HEP in next 1-2 visits to promote self efficacy, manage symptoms and meet other LTG.                    "

## 2025-04-21 NOTE — LETTER
April 24, 2025    Goldie Miranda MD  [address] [place] [id number]    Patient: Leslie Johnson   YOB: 1990   Date of Visit: 4/21/2025       Dear Goldie Miranda MD  [address] [place] [id number]    The attached plan of care is being sent to you because your patient’s medical reimbursement requires that you certify the plan of care. Your signature is required to allow uninterrupted insurance coverage.      You may indicate your approval by signing below and faxing this form back to us at Dept Fax: [phone].    Please call Dept: [phone] with any questions or concerns.    Thank you for this referral,        Trista Serna PT  [address] [address]    Payer: Payor: MEDICARE / Plan: MEDICARE PART A AND B / Product Type: *No Product type* /                                                                         Date:     Dear Trista Serna PT,     Re: Ms. Leslie Johnson, MRN:[id number]    I certify that I have reviewed the attached plan of care and it is medically necessary for Ms. Leslie Johnson (1990) who is under my care.          ______________________________________                    _________________  Provider name and credentials                                           Date and time                                                                                           Plan of Care 4/21/25   Effective from: 4/21/2025  Effective to: 6/19/2025    Plan ID: [id number]            Participants as of Finalize on 4/24/2025    Name Type Comments Contact Info    Goldie Miranda MD Referring Provider  [phone]    Trista Serna PT Physical Therapist  [phone]       Last Plan Note     Author: Trista Serna PT Status: Sign when Signing Visit Last edited: 4/21/2025  3:15 PM       Physical Therapy    Physical Therapy Evaluation and Treatment      Patient Name: Leslie Johnson  MRN: [id number]  Today's Date:  "4/24/2025  Medicare  POC end date: 6/19/25  Primary dx= gait abnormality   Time Entry:   Time Calculation  Start Time: 1515  Stop Time: 1620  Time Calculation (min): 65 min  PT Evaluation Time Entry  PT Evaluation (Moderate) Time Entry: 35  PT Therapeutic Procedures Time Entry  Therapeutic Exercise Time Entry: 30       Assessment:  Patient is a 34 year old female referred to [facility]'s outpatient physical therapy services for gait abnormality.  Ms. Johnson is known to this facility and was last discharged from my care on 1/11/24.  Pt. Reports that a couple of months ago she felt that her gait was off, however upon evaluation today pt. [place] that her gait was back to normal and appeared at baseline upon my observation.  Pt. Thinks that this gait issue may have been due to one of her medications.  The patient demonstrated good score on 5x sit to stand test today.  Pt. Scored a 23/30 on FGA scoring above fall risk cut off score.  Pt. Was challenged with head turns and vertical nods in addition to backwards walking.  Pt. Reports that she feels back to normal and doesn't feel that she needs therapy for her gait at this time.  Patient advocate \"Lizeth\" was present during visit and notified PT that the patient has been reporting intermittent low back pain. The patient reported reduced low back pain with repeated extension. Therefore provided pt. With extension based exercises for HEP.  PT suggested pt. Return for 1-2 additional visits to review HEP, pt. Verbally agreed with this plan.        Plan: review HEP  OP PT Plan  Treatment/Interventions: Cryotherapy, Education/ Instruction, Gait training, Hot pack, Manual therapy, Self care/ home management, Therapeutic activities, Therapeutic exercises, Taping techniques  PT Plan: Skilled PT  PT Frequency: 1 time per week  Duration: 2-3 visits  Onset Date: 02/18/25  Certification Period Start Date: 04/20/25  Certification Period End Date: 06/19/25  Rehab Potential: Good  Plan " "of Care Agreement: Patient    Current Problem:   1. Gait abnormality        2. Unspecified abnormalities of gait and mobility  Referral to Physical Therapy    Follow Up In Physical Therapy      3. Low back pain            Subjective    Patient Report:  Pt. Reports that she had abnormal gait disorder but it seemed to go away.  Pt. Support \"Lizeth\" available during subjective portion of examination per patient request.  Pt. Had a side effect from one of her medications and her gait was off but it went away, she had to go off of this and now she is doing better.  She sees Dr. Miranda [facility].  She also has Dr. Burgess is through [facility]. She had some lower back pain per \"Lizeth\".  It has been going on for 2 weeks.  She gets stomach cramps and diarrhea and she thinks it is from the topamax.  She had dizziness in February but never found out what was wrong, she needs to get blood work.  She had to get a new .  She has a counselor through the [facility], her name is Suzy Wilks.  Her  is \"Janee Hernandez\" through [facility].  She has surface support  through [facility] who is Monica Ken.     Home Setup:  lives on 2nd floor program  Hobbies: used to do bowling but she is done but it is going to Saturdays but she doesn't want to do that.  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 Interested with [employer] at [place]  Sleep: sleeping not great  Falls: one fall when she was playing putput, scraped her knees and elbow   Hydration: drinking a lot of water, she thinks she might have a UTI.   Pt. Goal: would like \"to get reassessed today\"       Precautions: hx. Severe bipolar disorder, depression, anxiety, autism spectrum     Vital Signs: 109/65 sitting BP electronic cuff, HR=83 BPM     Pain: 0/10        Objective " "  Posture=mildly rounded shoulders/forward head  Vision=glasses   Hearing=WNL  Sensation=WNL  Gait=normal speed, reciprocal pattern, no significant gait deviations noted    Lower Extremity Strength MMT (tested in seated)    Left Right   Hip flexion 5/5 5/5   Hip ADD  5/5 5/5   Hip ABD 5/5 5/5   Knee flexion 5/5 5/5   Knee Extension 5/5 5/5   Ankle Dorsiflexion 5/5 5/5   Ankle Plantarflexion 5/5 5/5   Ankle Inversion 5/5 5/5   Ankle Eversion 5/5 5/5        SLS:  RLE=19 sec.  LLE=9 sec.     ABC=1030 (64%)    Lumbar AROM:  Flexion=70'  Extension=30'  R rotation=dec. 25%   L rotation=WNL    Repeated lumbar extension=dec. Pain however pt. Reported more relief than flexion  Repeated lumbar flexion=dec pain.         Outcome Measures:  FGA=23/30  5x sit<>Stand test=12 sec. No UE support  30 second sit to stand test= 14 reps with no UE support  *ABC functional outcome measure=not scored due to pt. Filling out only 2/16 of the questions     Treatments:    There Ex:  Prone press-ups x10  Standing lumbar extensions   Xena pose 30\"x1  Provided pt. With printed handout for HEP    EDUCATION:  -see treatment/flowsheet       Goals:  Active       PT Problem       PT Goal 1       Start:  04/24/25    Expected End:  06/19/25       Pt. Will be independent in HEP in next 1-2 visits to promote self efficacy, manage symptoms and meet other LTG.                         Current Participants as of 4/24/2025    Name Type Comments Contact Info    Goldie Miranda MD Referring Provider  [phone]    Signature pending    Trista Serna, PT Physical Therapist  [phone]          "

## 2025-04-24 PROBLEM — M54.50 LOW BACK PAIN: Status: ACTIVE | Noted: 2025-04-24

## 2025-04-24 ASSESSMENT — ENCOUNTER SYMPTOMS
OCCASIONAL FEELINGS OF UNSTEADINESS: 0
DEPRESSION: 0
LOSS OF SENSATION IN FEET: 0

## 2025-05-08 ENCOUNTER — TREATMENT (OUTPATIENT)
Dept: PHYSICAL THERAPY | Facility: CLINIC | Age: 35
End: 2025-05-08
Payer: MEDICARE

## 2025-05-08 VITALS — DIASTOLIC BLOOD PRESSURE: 82 MMHG | HEART RATE: 82 BPM | SYSTOLIC BLOOD PRESSURE: 137 MMHG

## 2025-05-08 DIAGNOSIS — R26.9 UNSPECIFIED ABNORMALITIES OF GAIT AND MOBILITY: ICD-10-CM

## 2025-05-08 PROCEDURE — 97110 THERAPEUTIC EXERCISES: CPT | Mod: GP | Performed by: PHYSICAL THERAPIST

## 2025-05-08 ASSESSMENT — PAIN - FUNCTIONAL ASSESSMENT: PAIN_FUNCTIONAL_ASSESSMENT: 0-10

## 2025-05-08 ASSESSMENT — PAIN SCALES - GENERAL: PAINLEVEL_OUTOF10: 0 - NO PAIN

## 2025-05-08 NOTE — PROGRESS NOTES
"/Physical Therapy    Physical Therapy Treatment/Discharge    Patient Name: Leslie Johnson  MRN: [id number]  Today's Date: 5/8/25  Medicare  POC end date: 6/19/25  Primary dx=unspecified abnormalities of gait/mobility  Time Entry:   Time Calculation  Start Time: 1530  Stop Time: 1615  Time Calculation (min): 45 min     PT Therapeutic Procedures Time Entry  Therapeutic Exercise Time Entry: 45       Assessment:  Ms. Johnson returned today reporting that she has not had any back pain since she was here last and requested to review her home program.  The patient's HEP was reviewed in a circuit format three times.  The patient demonstrated good technique at independent level.  Ms. Johnson benefited from utilizing a timer for all exercises vs. Counting.  At this time the patient is at baseline and met her long term goal of gaining independent with her HEP. Pt. Will not be discharged, this plan was reviewed with pt. Advocate \"Caira\" today.     Plan: discharge       Current Problem  1. Unspecified abnormalities of gait and mobility  Follow Up In Physical Therapy                Subjective    Pt. Reports that she is doing a food diary of everything she eats/drinks due to the IBS.  She did the stretches that she got from here last but she hasn't had to do them often because she hasn't had back pain.  This Tuesday is her last day of [employer].  Pt. advocate \"Caira\" present during last 10 minutes of visit      Precautions: sever bipolar disorder, autism spectrum, no fall risk     Vital Signs: not taken     Pain: 0/10       Objective     Pt. Arrived to visit with no device        Treatments:    There Ex:  3 rounds of the following exercises performed with PT:  Prone press-ups 30\"x1  Standing lumbar extension 30 seconds  Xena pose 30\"    OP EDUCATION:  -see treatment       Goals:  Active       PT Problem       PT Goal 1       Start:  04/24/25    Expected End:  06/19/25       Pt. Will be independent in HEP in next 1-2 visits to promote " self efficacy, manage symptoms and meet other LTG.

## 2025-10-30 ENCOUNTER — APPOINTMENT (OUTPATIENT)
Dept: OPHTHALMOLOGY | Age: 35
End: 2025-10-30
Payer: MEDICARE